# Patient Record
Sex: FEMALE | Race: WHITE | Employment: FULL TIME | URBAN - METROPOLITAN AREA
[De-identification: names, ages, dates, MRNs, and addresses within clinical notes are randomized per-mention and may not be internally consistent; named-entity substitution may affect disease eponyms.]

---

## 2018-02-01 ENCOUNTER — HOSPITAL ENCOUNTER (OUTPATIENT)
Dept: LAB | Age: 38
Discharge: HOME | End: 2018-02-01
Attending: SURGERY
Payer: MEDICAID

## 2018-02-01 LAB — TSH SERPL DL<=0.005 MIU/L-ACNC: 3.18 UIU/ML (ref 0.36–3.74)

## 2018-02-01 PROCEDURE — 36415 COLL VENOUS BLD VENIPUNCTURE: CPT | Performed by: SURGERY

## 2018-02-01 PROCEDURE — 82652 VIT D 1 25-DIHYDROXY: CPT | Performed by: SURGERY

## 2018-02-01 PROCEDURE — 84443 ASSAY THYROID STIM HORMONE: CPT | Performed by: SURGERY

## 2018-02-01 PROCEDURE — 80323 ALKALOIDS NOS: CPT | Performed by: SURGERY

## 2018-02-02 LAB — 1,25(OH)2D3 SERPL-MCNC: 22.3 PG/ML (ref 19.9–79.3)

## 2018-02-04 LAB
COTININE SERPL-MCNC: NORMAL NG/ML
NICOTINE SERPL-MCNC: NORMAL NG/ML

## 2018-03-15 PROBLEM — E66.01 OBESITY, MORBID (HCC): Status: ACTIVE | Noted: 2018-03-15

## 2018-03-15 PROBLEM — R06.83 SNORING: Status: ACTIVE | Noted: 2018-03-15

## 2018-03-15 PROBLEM — Z01.811 PRE-OPERATIVE RESPIRATORY EXAMINATION: Status: ACTIVE | Noted: 2018-03-15

## 2018-03-15 PROBLEM — R76.11 POSITIVE PPD, TREATED: Status: ACTIVE | Noted: 2018-03-15

## 2018-03-15 PROBLEM — G25.81 RESTLESS LEG SYNDROME: Status: ACTIVE | Noted: 2018-03-15

## 2018-03-15 PROBLEM — G47.8 UNREFRESHED BY SLEEP: Status: ACTIVE | Noted: 2018-03-15

## 2018-05-03 ENCOUNTER — HOSPITAL ENCOUNTER (OUTPATIENT)
Dept: SLEEP MEDICINE | Age: 38
Discharge: HOME | End: 2018-05-03
Payer: MEDICAID

## 2018-05-03 PROCEDURE — 95806 SLEEP STUDY UNATT&RESP EFFT: CPT

## 2018-06-26 ENCOUNTER — HOSPITAL ENCOUNTER (OUTPATIENT)
Dept: SLEEP MEDICINE | Age: 38
Discharge: HOME | End: 2018-06-26
Payer: MEDICAID

## 2018-06-26 PROCEDURE — 95806 SLEEP STUDY UNATT&RESP EFFT: CPT

## 2018-12-13 PROBLEM — K59.01 SLOW TRANSIT CONSTIPATION: Status: ACTIVE | Noted: 2018-12-13

## 2019-07-25 PROBLEM — Z01.811 PRE-OPERATIVE RESPIRATORY EXAMINATION: Status: RESOLVED | Noted: 2018-03-15 | Resolved: 2019-07-25

## 2019-07-25 PROBLEM — R76.11 POSITIVE PPD, TREATED: Status: RESOLVED | Noted: 2018-03-15 | Resolved: 2019-07-25

## 2019-07-25 PROBLEM — G25.81 RESTLESS LEG SYNDROME: Status: RESOLVED | Noted: 2018-03-15 | Resolved: 2019-07-25

## 2019-07-25 PROBLEM — R06.83 SNORING: Status: RESOLVED | Noted: 2018-03-15 | Resolved: 2019-07-25

## 2019-10-10 PROBLEM — J06.9 URI, ACUTE: Status: ACTIVE | Noted: 2019-10-10
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
## 2021-08-31 ENCOUNTER — HOSPITAL ENCOUNTER (EMERGENCY)
Age: 41
Discharge: HOME | End: 2021-08-31
Payer: MEDICAID

## 2021-08-31 VITALS
SYSTOLIC BLOOD PRESSURE: 139 MMHG | RESPIRATION RATE: 18 BRPM | HEART RATE: 83 BPM | WEIGHT: 230 LBS | OXYGEN SATURATION: 100 % | BODY MASS INDEX: 39.27 KG/M2 | TEMPERATURE: 98.5 F | DIASTOLIC BLOOD PRESSURE: 90 MMHG | HEIGHT: 64 IN

## 2021-08-31 DIAGNOSIS — R05.9 COUGH: Primary | ICD-10-CM

## 2021-08-31 LAB — SARS-COV-2, COV2: NORMAL

## 2021-08-31 PROCEDURE — U0004 COV-19 TEST NON-CDC HGH THRU: HCPCS

## 2021-08-31 PROCEDURE — 99282 EMERGENCY DEPT VISIT SF MDM: CPT

## 2021-08-31 NOTE — Clinical Note
Jelani Van  [address] [facility] EMERGENCY DEPT  [address] [address]  [phone]    Work/School Note    Date: 8/31/2021     To Whom It May concern:    Bear Crawford was evaulated by the following provider(s):  No providers found. COVID19 virus is suspected. Per the CDC guidelines we recommend home isolation until the following conditions are all met:    1. At least 10 days have passed since symptoms first appeared and  2. At least 24 hours have passed since last fever without the use of fever-reducing medications and  3.  Symptoms (e.g., cough, shortness of breath) have improved    Sincerely,          NIKKO Cowan

## 2021-08-31 NOTE — ED NOTES

## 2021-08-31 NOTE — ED PROVIDER NOTES
21-year-old female, not currently vaccinated, presents to the emergency room with chief complaint of headache, fever, body aches and loss of taste and smell since the 25th of this month. She has not been taking medications for symptoms at home. Associate with some mild myalgias. Past Medical History:   Diagnosis Date    Fatigue     Lack of energy     Musculoskeletal disorder     joint pain, back pain, leg pain    Slow transit constipation 12/13/2018    Snoring     Unrefreshed by sleep 3/15/2018    Varicose veins of legs     Varicose veins of legs        Past Surgical History:   Procedure Laterality Date    HX GYN      leep procedure    HX TUBAL LIGATION  01/2016         Family History:   Problem Relation Age of Onset    Hypertension Mother     Hypertension Father     Hypertension Maternal Grandmother        Social History     Socioeconomic History    Marital status:      Spouse name: Not on file    Number of children: Not on file    Years of education: Not on file    Highest education level: Not on file   Occupational History    Not on file   Tobacco Use    Smoking status: Never Smoker    Smokeless tobacco: Never Used   Substance and Sexual Activity    Alcohol use: Yes     Comment: occasional    Drug use: No    Sexual activity: Not on file   Other Topics Concern    Not on file   Social History Narrative    Not on file     Social Determinants of Health     Financial Resource Strain:     Difficulty of Paying Living Expenses:    Food Insecurity:     Worried About Running Out of Food in the Last Year:     Ran Out of Food in the Last Year:    Transportation Needs:     Lack of Transportation (Medical):      Lack of Transportation (Non-Medical):    Physical Activity:     Days of Exercise per Week:     Minutes of Exercise per Session:    Stress:     Feeling of Stress :    Social Connections:     Frequency of Communication with Friends and Family:     Frequency of Social Gatherings with Friends and Family:     Attends Faith Services:     Active Member of Clubs or Organizations:     Attends Club or Organization Meetings:     Marital Status:    Intimate Partner Violence:     Fear of Current or Ex-Partner:     Emotionally Abused:     Physically Abused:     Sexually Abused: ALLERGIES: Sulfa (sulfonamide antibiotics) and Ciprofloxacin    Review of Systems   Constitutional: Positive for chills and fever. HENT: Negative for facial swelling. Respiratory: Positive for cough. Negative for chest tightness and shortness of breath. Cardiovascular: Negative for chest pain. Gastrointestinal: Negative for abdominal pain, nausea and vomiting. Musculoskeletal: Positive for myalgias. Neurological: Positive for headaches. Psychiatric/Behavioral: Negative for confusion. All other systems reviewed and are negative. There were no vitals filed for this visit. Physical Exam  Vitals and nursing note reviewed. Constitutional:       Appearance: Normal appearance. HENT:      Head: Normocephalic and atraumatic. Mouth/Throat:      Mouth: Mucous membranes are moist.   Eyes:      Pupils: Pupils are equal, round, and reactive to light. Cardiovascular:      Rate and Rhythm: Normal rate and regular rhythm. Pulmonary:      Effort: No respiratory distress. Breath sounds: Normal breath sounds. Abdominal:      Palpations: Abdomen is soft. Tenderness: There is no abdominal tenderness. There is no guarding. Skin:     General: Skin is warm and dry. Neurological:      Mental Status: She is alert and oriented to person, place, and time. Mental status is at baseline. Psychiatric:         Mood and Affect: Mood normal.          MDM  Number of Diagnoses or Management Options  Diagnosis management comments: Unremarkable physical exam, she has 100 and oxygenation on room air.   Advised that she quarantine at home, PCR swab was collected here in the emergency room. She was instructed to check my chart for her test results tomorrow morning. At time of discharge patient is resting comfortably no acute distress. Voice dictation software was used during the making of this note. This software is not perfect and grammatical and other typographical errors may be present. This note has been proofread, but may still contain errors.    Blake Cifuentes PA-C        Amount and/or Complexity of Data Reviewed  Clinical lab tests: ordered    Risk of Complications, Morbidity, and/or Mortality  Presenting problems: low  Diagnostic procedures: low  Management options: low    Patient Progress  Patient progress: improved         Procedures

## 2021-09-02 LAB — SARS COV-2, XPGCVT: POSITIVE

## 2021-09-16 ENCOUNTER — HOSPITAL ENCOUNTER (EMERGENCY)
Age: 41
Discharge: HOME | End: 2021-09-16
Payer: MEDICAID

## 2021-09-16 VITALS
WEIGHT: 229.94 LBS | TEMPERATURE: 98.1 F | HEIGHT: 64 IN | DIASTOLIC BLOOD PRESSURE: 61 MMHG | SYSTOLIC BLOOD PRESSURE: 100 MMHG | HEART RATE: 85 BPM | BODY MASS INDEX: 39.26 KG/M2 | RESPIRATION RATE: 16 BRPM | OXYGEN SATURATION: 100 %

## 2021-09-16 DIAGNOSIS — A59.9 TRICHOMONAS VAGINALIS INFECTION: ICD-10-CM

## 2021-09-16 DIAGNOSIS — B96.89 BV (BACTERIAL VAGINOSIS): Primary | ICD-10-CM

## 2021-09-16 DIAGNOSIS — N39.0 URINARY TRACT INFECTION WITHOUT HEMATURIA, SITE UNSPECIFIED: ICD-10-CM

## 2021-09-16 DIAGNOSIS — N76.0 BV (BACTERIAL VAGINOSIS): Primary | ICD-10-CM

## 2021-09-16 LAB
BACTERIA URNS QL MICRO: 0 /HPF
CASTS URNS QL MICRO: ABNORMAL /LPF
EPI CELLS #/AREA URNS HPF: ABNORMAL /HPF
HCG UR QL: NEGATIVE
RBC #/AREA URNS HPF: ABNORMAL /HPF
SERVICE CMNT-IMP: NORMAL
WBC URNS QL MICRO: >100 /HPF
WET PREP GENITAL: NORMAL

## 2021-09-16 PROCEDURE — 81025 URINE PREGNANCY TEST: CPT

## 2021-09-16 PROCEDURE — 81015 MICROSCOPIC EXAM OF URINE: CPT

## 2021-09-16 PROCEDURE — 87491 CHLMYD TRACH DNA AMP PROBE: CPT

## 2021-09-16 PROCEDURE — 99284 EMERGENCY DEPT VISIT MOD MDM: CPT

## 2021-09-16 PROCEDURE — 87210 SMEAR WET MOUNT SALINE/INK: CPT

## 2021-09-16 RX ORDER — METRONIDAZOLE 500 MG/1
500 TABLET ORAL 2 TIMES DAILY
Qty: 14 TABLET | Refills: 0 | Status: SHIPPED | OUTPATIENT
Start: 2021-09-16 | End: 2021-09-23

## 2021-09-16 RX ORDER — CEPHALEXIN 500 MG/1
500 CAPSULE ORAL 4 TIMES DAILY
Qty: 28 CAPSULE | Refills: 0 | Status: SHIPPED | OUTPATIENT
Start: 2021-09-16 | End: 2021-09-23

## 2021-09-16 NOTE — ED PROVIDER NOTES
50-year-old female complaining of vaginal discharge and lower abdominal discomfort. Vaginal Discharge   This is a new problem. The current episode started more than 2 days ago. The problem occurs constantly. The problem has not changed since onset. The discharge was malodorous. Associated symptoms include abdominal pain and constipation. Pertinent negatives include no diarrhea. Abdominal Pain   Associated symptoms include constipation. Pertinent negatives include no diarrhea. Past Medical History:   Diagnosis Date    Fatigue     Lack of energy     Musculoskeletal disorder     joint pain, back pain, leg pain    Slow transit constipation 12/13/2018    Snoring     Unrefreshed by sleep 3/15/2018    URI, acute     Varicose veins of legs     Varicose veins of legs        Past Surgical History:   Procedure Laterality Date    HX GYN      leep procedure    HX TUBAL LIGATION  01/2016         Family History:   Problem Relation Age of Onset    Hypertension Mother     Hypertension Father     Hypertension Maternal Grandmother        Social History     Socioeconomic History    Marital status:      Spouse name: Not on file    Number of children: Not on file    Years of education: Not on file    Highest education level: Not on file   Occupational History    Not on file   Tobacco Use    Smoking status: Never Smoker    Smokeless tobacco: Never Used   Substance and Sexual Activity    Alcohol use: Yes     Comment: occasional    Drug use: No    Sexual activity: Not on file   Other Topics Concern    Not on file   Social History Narrative    Not on file     Social Determinants of Health     Financial Resource Strain:     Difficulty of Paying Living Expenses:    Food Insecurity:     Worried About Running Out of Food in the Last Year:     Ran Out of Food in the Last Year:    Transportation Needs:     Lack of Transportation (Medical):      Lack of Transportation (Non-Medical):    Physical Activity:     Days of Exercise per Week:     Minutes of Exercise per Session:    Stress:     Feeling of Stress :    Social Connections:     Frequency of Communication with Friends and Family:     Frequency of Social Gatherings with Friends and Family:     Attends Spiritism Services:     Active Member of Clubs or Organizations:     Attends Club or Organization Meetings:     Marital Status:    Intimate Partner Violence:     Fear of Current or Ex-Partner:     Emotionally Abused:     Physically Abused:     Sexually Abused: ALLERGIES: Sulfa (sulfonamide antibiotics) and Ciprofloxacin    Review of Systems   Constitutional: Negative. Negative for activity change. HENT: Negative. Eyes: Negative. Respiratory: Negative. Cardiovascular: Negative. Gastrointestinal: Positive for abdominal pain and constipation. Negative for diarrhea. Genitourinary: Positive for vaginal discharge. Musculoskeletal: Negative. Skin: Negative. Neurological: Negative. Psychiatric/Behavioral: Negative. All other systems reviewed and are negative. Vitals:    09/16/21 0258 09/16/21 0620 09/16/21 0621 09/16/21 0622   BP: 103/71 100/61     Pulse: 96  85 85   Resp: 16      Temp: 98.1 °F (36.7 °C)      SpO2: 97%  100% 100%   Weight: 104.3 kg (229 lb 15 oz)      Height: 5' 4\" (1.626 m)               Physical Exam  Vitals and nursing note reviewed. Constitutional:       General: She is not in acute distress. Appearance: She is well-developed. HENT:      Head: Normocephalic and atraumatic. Right Ear: External ear normal.      Left Ear: External ear normal.      Nose: Nose normal.   Eyes:      General: No scleral icterus. Right eye: No discharge. Left eye: No discharge. Conjunctiva/sclera: Conjunctivae normal.      Pupils: Pupils are equal, round, and reactive to light. Cardiovascular:      Rate and Rhythm: Regular rhythm.    Pulmonary:      Effort: Pulmonary effort is normal. No respiratory distress. Breath sounds: Normal breath sounds. No stridor. No wheezing or rales. Abdominal:      General: Bowel sounds are normal. There is no distension. Palpations: Abdomen is soft. Tenderness: There is no abdominal tenderness. Musculoskeletal:         General: Normal range of motion. Cervical back: Normal range of motion. Skin:     General: Skin is warm and dry. Findings: No rash. Neurological:      Mental Status: She is alert and oriented to person, place, and time. Motor: No abnormal muscle tone. Coordination: Coordination normal.   Psychiatric:         Behavior: Behavior normal.          MDM  Number of Diagnoses or Management Options  BV (bacterial vaginosis)  Trichomonas vaginalis infection  Urinary tract infection without hematuria, site unspecified  Diagnosis management comments: Patient's abdominal discomfort had resolved prior to the initial evaluation. Serial examination of the abdomen was soft nontender bowel sounds were present. She did discuss having some constipation earlier. She was found to have both bacterial vaginosis and trichomonas on the wet prep. We will treat her with Flagyl.   She also had a UTI we will give her antibiotics for that as well       Amount and/or Complexity of Data Reviewed  Clinical lab tests: ordered and reviewed  Tests in the medicine section of CPT®: ordered and reviewed  Decide to obtain previous medical records or to obtain history from someone other than the patient: yes  Review and summarize past medical records: yes  Independent visualization of images, tracings, or specimens: yes           Procedures

## 2021-09-16 NOTE — ED NOTES
I have reviewed discharge instructions with the patient. The patient verbalized understanding. Patient left ED via Discharge Method: ambulatory to Home with self. Opportunity for questions and clarification provided. Patient given 2 scripts. To continue your aftercare when you leave the hospital, you may receive an automated call from our care team to check in on how you are doing. This is a free service and part of our promise to provide the best care and service to meet your aftercare needs.  If you have questions, or wish to unsubscribe from this service please call [phone]. Thank you for Choosing our [facility] Emergency Department.

## 2021-09-16 NOTE — ED TRIAGE NOTES
Pt states that she has noticed some loser abd pain with yellow vaginal drainage for approx 4 days.   Masked on arrrival

## 2021-09-17 LAB
C TRACH RRNA SPEC QL NAA+PROBE: NEGATIVE
N GONORRHOEA RRNA SPEC QL NAA+PROBE: NEGATIVE
SPECIMEN SOURCE: NORMAL

## 2021-09-25 ENCOUNTER — HOSPITAL ENCOUNTER (EMERGENCY)
Age: 41
Discharge: HOME | End: 2021-09-25
Attending: EMERGENCY MEDICINE
Payer: MEDICAID

## 2021-09-25 VITALS
TEMPERATURE: 98.5 F | DIASTOLIC BLOOD PRESSURE: 56 MMHG | SYSTOLIC BLOOD PRESSURE: 117 MMHG | RESPIRATION RATE: 16 BRPM | HEART RATE: 88 BPM | OXYGEN SATURATION: 97 %

## 2021-09-25 DIAGNOSIS — L03.115 CELLULITIS OF RIGHT LOWER EXTREMITY: Primary | ICD-10-CM

## 2021-09-25 PROCEDURE — 74011250637 HC RX REV CODE- 250/637: Performed by: EMERGENCY MEDICINE

## 2021-09-25 PROCEDURE — 99283 EMERGENCY DEPT VISIT LOW MDM: CPT

## 2021-09-25 RX ORDER — CLINDAMYCIN HYDROCHLORIDE 150 MG/1
300 CAPSULE ORAL
Status: COMPLETED | OUTPATIENT
Start: 2021-09-25 | End: 2021-09-25

## 2021-09-25 RX ORDER — CLINDAMYCIN HYDROCHLORIDE 300 MG/1
300 CAPSULE ORAL 4 TIMES DAILY
Qty: 28 CAPSULE | Refills: 0 | Status: SHIPPED | OUTPATIENT
Start: 2021-09-25 | End: 2021-10-02

## 2021-09-25 RX ADMIN — CLINDAMYCIN HYDROCHLORIDE 300 MG: 150 CAPSULE ORAL at 03:34

## 2021-09-25 NOTE — ED PROVIDER NOTES
Chief complaint : Insect bite    HISTORY OF PRESENT ILLNESS :  Location : Right lower leg just above ankle    Quality : Warm and red    Quantity : Constant    Timing : 2 days    Severity : Mild    Context : Is not sure what bit or stung her    Alleviating / exacerbating factors : Just completing Keflex antibiotics for UTI which has not seemed to margaux the skin infection    Associated Symptoms : No fevers             Past Medical History:   Diagnosis Date    Fatigue     Lack of energy     Musculoskeletal disorder     joint pain, back pain, leg pain    Slow transit constipation 12/13/2018    Snoring     Unrefreshed by sleep 3/15/2018    URI, acute     Varicose veins of legs     Varicose veins of legs        Past Surgical History:   Procedure Laterality Date    HX GYN      leep procedure    HX TUBAL LIGATION  01/2016         Family History:   Problem Relation Age of Onset    Hypertension Mother     Hypertension Father     Hypertension Maternal Grandmother        Social History     Socioeconomic History    Marital status: LEGALLY      Spouse name: Not on file    Number of children: Not on file    Years of education: Not on file    Highest education level: Not on file   Occupational History    Not on file   Tobacco Use    Smoking status: Never Smoker    Smokeless tobacco: Never Used   Substance and Sexual Activity    Alcohol use: Yes     Comment: occasional    Drug use: No    Sexual activity: Not on file   Other Topics Concern    Not on file   Social History Narrative    Not on file     Social Determinants of Health     Financial Resource Strain:     Difficulty of Paying Living Expenses:    Food Insecurity:     Worried About Running Out of Food in the Last Year:     Ran Out of Food in the Last Year:    Transportation Needs:     Lack of Transportation (Medical):      Lack of Transportation (Non-Medical):    Physical Activity:     Days of Exercise per Week:     Minutes of Exercise per Session:    Stress:     Feeling of Stress :    Social Connections:     Frequency of Communication with Friends and Family:     Frequency of Social Gatherings with Friends and Family:     Attends Voodoo Services:     Active Member of Clubs or Organizations:     Attends Club or Organization Meetings:     Marital Status:    Intimate Partner Violence:     Fear of Current or Ex-Partner:     Emotionally Abused:     Physically Abused:     Sexually Abused: ALLERGIES: Sulfa (sulfonamide antibiotics) and Ciprofloxacin    Review of Systems   Constitutional: Negative for chills and fever. Musculoskeletal: Negative for arthralgias and joint swelling. Skin: Positive for color change and rash. Negative for pallor and wound. Neurological: Negative for weakness and numbness. Vitals:    09/25/21 0208 09/25/21 0211   BP: (!) 117/56 (!) 117/56   Pulse:  88   Resp:  16   Temp:  98.5 °F (36.9 °C)   SpO2:  97%            Physical Exam  Vitals and nursing note reviewed. Constitutional:       General: She is not in acute distress. Appearance: Normal appearance. She is well-developed. She is not ill-appearing, toxic-appearing or diaphoretic. HENT:      Head: Normocephalic and atraumatic. Right Ear: External ear normal.      Left Ear: External ear normal.   Eyes:      General:         Right eye: No discharge. Left eye: No discharge. Conjunctiva/sclera: Conjunctivae normal.   Pulmonary:      Effort: Pulmonary effort is normal. No respiratory distress. Musculoskeletal:         General: Normal range of motion. Cervical back: Normal range of motion and neck supple. Legs:    Skin:     General: Skin is warm and dry. Findings: No rash. Neurological:      General: No focal deficit present. Mental Status: She is alert and oriented to person, place, and time. Mental status is at baseline. Motor: No abnormal muscle tone.       Comments: cni 2-12 grossly  Nl gait,  Nl speech     Psychiatric:         Mood and Affect: Mood normal.         Behavior: Behavior normal.          MDM  Number of Diagnoses or Management Options  Cellulitis of right lower extremity: new and does not require workup  Diagnosis management comments: Medical decision making note:  Cellulitis to right lower extremity presumptive insect bite, just finished Keflex, will start clindamycin  Area of involvement outlined with surgical skin marker  This concludes the \"medical decision making note\" part of this emergency department visit note.          Amount and/or Complexity of Data Reviewed  Decide to obtain previous medical records or to obtain history from someone other than the patient: yes    Risk of Complications, Morbidity, and/or Mortality  Presenting problems: moderate  Diagnostic procedures: minimal  Management options: low    Patient Progress  Patient progress: improved         Procedures

## 2021-09-25 NOTE — ED NOTES
I have reviewed discharge instructions with the patient. The patient verbalized understanding. Patient left ED via Discharge Method: ambulatory to Home with self  . Opportunity for questions and clarification provided. Patient given 1 scripts. To continue your aftercare when you leave the hospital, you may receive an automated call from our care team to check in on how you are doing. This is a free service and part of our promise to provide the best care and service to meet your aftercare needs.  If you have questions, or wish to unsubscribe from this service please call [phone]. Thank you for Choosing our [address] [place] [address] Emergency Department.

## 2022-01-10 ENCOUNTER — HOSPITAL ENCOUNTER (EMERGENCY)
Age: 42
Discharge: HOME | End: 2022-01-10
Payer: MEDICAID

## 2022-01-10 VITALS
HEIGHT: 64 IN | HEART RATE: 60 BPM | DIASTOLIC BLOOD PRESSURE: 72 MMHG | SYSTOLIC BLOOD PRESSURE: 110 MMHG | BODY MASS INDEX: 38.41 KG/M2 | RESPIRATION RATE: 18 BRPM | OXYGEN SATURATION: 97 % | TEMPERATURE: 98.1 F | WEIGHT: 225 LBS

## 2022-01-10 DIAGNOSIS — B96.89 BV (BACTERIAL VAGINOSIS): Primary | ICD-10-CM

## 2022-01-10 DIAGNOSIS — N76.0 BV (BACTERIAL VAGINOSIS): Primary | ICD-10-CM

## 2022-01-10 LAB
COVID-19 RAPID TEST, COVR: NOT DETECTED
SERVICE CMNT-IMP: NORMAL
SOURCE, COVRS: NORMAL
WET PREP GENITAL: NORMAL

## 2022-01-10 PROCEDURE — 99283 EMERGENCY DEPT VISIT LOW MDM: CPT

## 2022-01-10 PROCEDURE — 87635 SARS-COV-2 COVID-19 AMP PRB: CPT

## 2022-01-10 PROCEDURE — 87491 CHLMYD TRACH DNA AMP PROBE: CPT

## 2022-01-10 PROCEDURE — 87210 SMEAR WET MOUNT SALINE/INK: CPT

## 2022-01-10 RX ORDER — METRONIDAZOLE 500 MG/1
500 TABLET ORAL 2 TIMES DAILY
Qty: 14 TABLET | Refills: 0 | Status: SHIPPED | OUTPATIENT
Start: 2022-01-10 | End: 2022-01-17

## 2022-01-10 NOTE — ED NOTES
I have reviewed discharge instructions with the patient. The patient verbalized understanding. Patient left ED via Discharge Method: ambulatory to Home with self. Opportunity for questions and clarification provided. Patient given 1 scripts. To continue your aftercare when you leave the hospital, you may receive an automated call from our care team to check in on how you are doing. This is a free service and part of our promise to provide the best care and service to meet your aftercare needs.  If you have questions, or wish to unsubscribe from this service please call [phone]. Thank you for Choosing our [address] [place] [address] Emergency Department.

## 2022-01-10 NOTE — ED PROVIDER NOTES
42-year-old female is curious whether she has Covid of symptoms. She is diagnosed with Covid back in December 30 she has to return to work and needs a negative test before she gets back. Patient also mentions that she has vaginal discharge that comes and goes. Vaginal Discharge   This is a recurrent problem. The current episode started more than 2 days ago. The problem occurs constantly. The problem has not changed since onset. The discharge was malodorous. Pertinent negatives include no anorexia, no diaphoresis, no fever, no abdominal swelling, no abdominal pain, no constipation, no diarrhea, no nausea and no vomiting. She has tried nothing for the symptoms.         Past Medical History:   Diagnosis Date    Fatigue     Lack of energy     Musculoskeletal disorder     joint pain, back pain, leg pain    Slow transit constipation 12/13/2018    Snoring     Unrefreshed by sleep 3/15/2018    URI, acute     Varicose veins of legs     Varicose veins of legs        Past Surgical History:   Procedure Laterality Date    HX GYN      leep procedure    HX TUBAL LIGATION  01/2016         Family History:   Problem Relation Age of Onset    Hypertension Mother     Hypertension Father     Hypertension Maternal Grandmother        Social History     Socioeconomic History    Marital status: LEGALLY      Spouse name: Not on file    Number of children: Not on file    Years of education: Not on file    Highest education level: Not on file   Occupational History    Not on file   Tobacco Use    Smoking status: Never Smoker    Smokeless tobacco: Never Used   Substance and Sexual Activity    Alcohol use: Yes     Comment: occasional    Drug use: No    Sexual activity: Not on file   Other Topics Concern    Not on file   Social History Narrative    Not on file     Social Determinants of Health     Financial Resource Strain:     Difficulty of Paying Living Expenses: Not on file   Food Insecurity:     Worried About [facility] in the Last Year: Not on file    Ankit of Food in the Last Year: Not on file   Transportation Needs:     Lack of Transportation (Medical): Not on file    Lack of Transportation (Non-Medical): Not on file   Physical Activity:     Days of Exercise per Week: Not on file    Minutes of Exercise per Session: Not on file   Stress:     Feeling of Stress : Not on file   Social Connections:     Frequency of Communication with Friends and Family: Not on file    Frequency of Social Gatherings with Friends and Family: Not on file    Attends Hindu Services: Not on file    Active Member of [address] [place] [address] or Organizations: Not on file    Attends Club or Organization Meetings: Not on file    Marital Status: Not on file   Intimate Partner Violence:     Fear of Current or Ex-Partner: Not on file    Emotionally Abused: Not on file    Physically Abused: Not on file    Sexually Abused: Not on file   Housing Stability:     Unable to Pay for Housing in the Last Year: Not on file    Number of [place] in the Last Year: Not on file    Unstable Housing in the Last Year: Not on file         ALLERGIES: Sulfa (sulfonamide antibiotics) and Ciprofloxacin    Review of Systems   Constitutional: Negative. Negative for activity change, diaphoresis and fever. HENT: Negative. Eyes: Negative. Respiratory: Negative. Cardiovascular: Negative. Gastrointestinal: Negative. Negative for abdominal pain, anorexia, constipation, diarrhea, nausea and vomiting. Genitourinary: Positive for vaginal discharge. Musculoskeletal: Negative. Skin: Negative. Neurological: Negative. Psychiatric/Behavioral: Negative. All other systems reviewed and are negative. Vitals:    01/10/22 0137   BP: 110/72   Pulse: 61   Resp: 18   Temp: 98.1 °F (36.7 °C)   SpO2: 97%   Weight: 102.1 kg (225 lb)   Height: 5' 4\" (1.626 m)            Physical Exam  Vitals and nursing note reviewed. Constitutional:       General: She is not in acute distress. Appearance: She is well-developed. HENT:      Head: Normocephalic and atraumatic. Right Ear: External ear normal.      Left Ear: External ear normal.      Nose: Nose normal.   Eyes:      General: No scleral icterus. Right eye: No discharge. Left eye: No discharge. Conjunctiva/sclera: Conjunctivae normal.      Pupils: Pupils are equal, round, and reactive to light. Cardiovascular:      Rate and Rhythm: Regular rhythm. Pulmonary:      Effort: Pulmonary effort is normal. No respiratory distress. Breath sounds: No stridor. No wheezing or rales. Abdominal:      General: There is no distension. Palpations: Abdomen is soft. Tenderness: There is no abdominal tenderness. Musculoskeletal:         General: Normal range of motion. Cervical back: Normal range of motion. Skin:     General: Skin is warm and dry. Findings: No rash. Neurological:      Mental Status: She is alert and oriented to person, place, and time. Motor: No abnormal muscle tone. Coordination: Coordination normal.   Psychiatric:         Behavior: Behavior normal.          MDM  Number of Diagnoses or Management Options  Diagnosis management comments: Patient came to the emergency department at 2 AM to be tested for COVID-19. She needs a negative test before returning to work. She also while she was here thought she would get treated for vaginal discharge. She has had bacterial vaginosis in the past.  She did not follow-up with gynecology as instructed.        Amount and/or Complexity of Data Reviewed  Clinical lab tests: ordered and reviewed  Tests in the medicine section of CPT®: ordered and reviewed  Decide to obtain previous medical records or to obtain history from someone other than the patient: yes  Review and summarize past medical records: yes  Independent visualization of images, tracings, or specimens: yes    Risk of Complications, Morbidity, and/or Mortality  Presenting problems: moderate  Diagnostic procedures: moderate  Management options: moderate           Procedures

## 2022-02-15 ENCOUNTER — HOSPITAL ENCOUNTER (EMERGENCY)
Age: 42
Discharge: HOME | End: 2022-02-15
Attending: STUDENT IN AN ORGANIZED HEALTH CARE EDUCATION/TRAINING PROGRAM
Payer: MEDICAID

## 2022-02-15 VITALS
SYSTOLIC BLOOD PRESSURE: 119 MMHG | OXYGEN SATURATION: 100 % | RESPIRATION RATE: 18 BRPM | WEIGHT: 225.09 LBS | TEMPERATURE: 98.6 F | DIASTOLIC BLOOD PRESSURE: 78 MMHG | HEART RATE: 75 BPM | BODY MASS INDEX: 38.43 KG/M2 | HEIGHT: 64 IN

## 2022-02-15 DIAGNOSIS — B96.89 BV (BACTERIAL VAGINOSIS): Primary | ICD-10-CM

## 2022-02-15 DIAGNOSIS — N76.0 BV (BACTERIAL VAGINOSIS): Primary | ICD-10-CM

## 2022-02-15 LAB
BACTERIA URNS QL MICRO: NORMAL /HPF
CASTS URNS QL MICRO: NORMAL /LPF
EPI CELLS #/AREA URNS HPF: NORMAL /HPF
RBC #/AREA URNS HPF: NORMAL /HPF
SERVICE CMNT-IMP: NORMAL
WBC URNS QL MICRO: NORMAL /HPF
WET PREP GENITAL: NORMAL

## 2022-02-15 PROCEDURE — 87491 CHLMYD TRACH DNA AMP PROBE: CPT

## 2022-02-15 PROCEDURE — 81015 MICROSCOPIC EXAM OF URINE: CPT

## 2022-02-15 PROCEDURE — 99284 EMERGENCY DEPT VISIT MOD MDM: CPT

## 2022-02-15 PROCEDURE — 81003 URINALYSIS AUTO W/O SCOPE: CPT

## 2022-02-15 PROCEDURE — 87210 SMEAR WET MOUNT SALINE/INK: CPT

## 2022-02-15 RX ORDER — METRONIDAZOLE 500 MG/1
500 TABLET ORAL 2 TIMES DAILY
Qty: 14 TABLET | Refills: 0 | Status: SHIPPED | OUTPATIENT
Start: 2022-02-15 | End: 2022-02-22

## 2022-02-15 NOTE — ED PROVIDER NOTES
43-year-old female presents to the emergency department complaining of foul-smelling vaginal discharge. States has been present for approximately 1 week. Denies dysuria or vaginal lesions. Denies tampon or any foreign body left within her vagina. Denies concern for STDs. States abdominal pain is intermittent and is not present at this time. Reports she was seen for similar issue approximately 1 month ago. States it did improve but has returned. Past Medical History:   Diagnosis Date    Fatigue     Lack of energy     Musculoskeletal disorder     joint pain, back pain, leg pain    Slow transit constipation 12/13/2018    Snoring     Unrefreshed by sleep 3/15/2018    URI, acute     Varicose veins of legs     Varicose veins of legs        Past Surgical History:   Procedure Laterality Date    HX GYN      leep procedure    HX TUBAL LIGATION  01/2016         Family History:   Problem Relation Age of Onset    Hypertension Mother     Hypertension Father     Hypertension Maternal Grandmother        Social History     Socioeconomic History    Marital status: LEGALLY      Spouse name: Not on file    Number of children: Not on file    Years of education: Not on file    Highest education level: Not on file   Occupational History    Not on file   Tobacco Use    Smoking status: Never Smoker    Smokeless tobacco: Never Used   Substance and Sexual Activity    Alcohol use: Yes     Comment: occasional    Drug use: No    Sexual activity: Not on file   Other Topics Concern    Not on file   Social History Narrative    Not on file     Social Determinants of Health     Financial Resource Strain:     Difficulty of Paying Living Expenses: Not on file   Food Insecurity:     Worried About Running Out of Food in the Last Year: Not on file    Ankit of Food in the Last Year: Not on file   Transportation Needs:     Lack of Transportation (Medical):  Not on file    Lack of Transportation (Non-Medical): Not on file   Physical Activity:     Days of Exercise per Week: Not on file    Minutes of Exercise per Session: Not on file   Stress:     Feeling of Stress : Not on file   Social Connections:     Frequency of Communication with Friends and Family: Not on file    Frequency of Social Gatherings with Friends and Family: Not on file    Attends Jew Services: Not on file    Active Member of [address] [place] [address] or Organizations: Not on file    Attends Club or Organization Meetings: Not on file    Marital Status: Not on file   Intimate Partner Violence:     Fear of Current or Ex-Partner: Not on file    Emotionally Abused: Not on file    Physically Abused: Not on file    Sexually Abused: Not on file   Housing Stability:     Unable to Pay for Housing in the Last Year: Not on file    Number of [place] in the Last Year: Not on file    Unstable Housing in the Last Year: Not on file         ALLERGIES: Sulfa (sulfonamide antibiotics) and Ciprofloxacin    Review of Systems   Constitutional: Negative for chills and fever. HENT: Negative for sinus pressure and sore throat. Eyes: Negative for visual disturbance. Respiratory: Negative for cough and shortness of breath. Cardiovascular: Negative for chest pain. Gastrointestinal: Negative for abdominal pain, diarrhea, nausea and vomiting. Endocrine: Negative for polyuria. Genitourinary: Positive for vaginal discharge. Negative for difficulty urinating and dysuria. Musculoskeletal: Negative for neck pain and neck stiffness. Skin: Negative for rash. Neurological: Negative for weakness and headaches. Psychiatric/Behavioral: Negative for confusion. Vitals:    02/15/22 0304   BP: 119/78   Pulse: 71   Resp: 20   Temp: 98.5 °F (36.9 °C)   SpO2: 98%   Weight: 102.1 kg (225 lb 1.4 oz)   Height: 5' 4\" (1.626 m)            Physical Exam  Vitals and nursing note reviewed. Constitutional:       Appearance: Normal appearance.  She is not ill-appearing or toxic-appearing. HENT:      Head: Normocephalic and atraumatic. Nose: Nose normal.      Mouth/Throat:      Mouth: Mucous membranes are moist.   Eyes:      Extraocular Movements: Extraocular movements intact. Cardiovascular:      Rate and Rhythm: Normal rate and regular rhythm. Pulses: Normal pulses. Heart sounds: Normal heart sounds. Pulmonary:      Effort: Pulmonary effort is normal. No respiratory distress. Breath sounds: Normal breath sounds. Abdominal:      General: Abdomen is flat. There is no distension. Palpations: Abdomen is soft. Tenderness: There is no abdominal tenderness. Musculoskeletal:         General: Normal range of motion. Cervical back: Normal range of motion. No rigidity. Skin:     General: Skin is warm and dry. Neurological:      General: No focal deficit present. Mental Status: She is alert and oriented to person, place, and time. Psychiatric:         Mood and Affect: Mood normal.          MDM  Number of Diagnoses or Management Options  BV (bacterial vaginosis)  Diagnosis management comments: Well-appearing 27-year-old female presents to the emergency department with foul-smelling vaginal discharge. Wet prep shows bacterial vaginosis. Urinalysis is nondiagnostic for having a urinary tract infection. Gonorrhea and Chlamydia pending. Patient advised to follow-up with her OB/GYN within 1 week. Given prescription for Flagyl. Given strict return precautions.   She voiced understanding agreement with this plan       Amount and/or Complexity of Data Reviewed  Clinical lab tests: ordered and reviewed  Decide to obtain previous medical records or to obtain history from someone other than the patient: yes  Review and summarize past medical records: yes    Risk of Complications, Morbidity, and/or Mortality  Presenting problems: moderate  Diagnostic procedures: moderate  Management options: moderate Procedures

## 2022-02-15 NOTE — ED TRIAGE NOTES
Pt states that she is having lower abd pain and foul smelling vaginal draingage x 1 week. Recently treated for BV.   Masked on arrival

## 2022-02-15 NOTE — DISCHARGE INSTRUCTIONS
Take antibiotics as prescribed. Follow-up with OB/GYN in 1 to 2 weeks. Return to the ER for worsening or worrisome symptoms.

## 2022-02-15 NOTE — ED NOTES
I have reviewed discharge instructions with the patient. The patient verbalized understanding. Patient left ED via Discharge Method: ambulatory to Home with self. Opportunity for questions and clarification provided. Patient given 1 scripts. To continue your aftercare when you leave the hospital, you may receive an automated call from our care team to check in on how you are doing. This is a free service and part of our promise to provide the best care and service to meet your aftercare needs.  If you have questions, or wish to unsubscribe from this service please call [phone]. Thank you for Choosing our [facility] Emergency Department.

## 2022-03-18 PROBLEM — G47.8 UNREFRESHED BY SLEEP: Status: ACTIVE | Noted: 2018-03-15

## 2022-03-18 PROBLEM — E66.01 OBESITY, MORBID (HCC): Status: ACTIVE | Noted: 2018-03-15

## 2022-03-18 PROBLEM — K59.01 SLOW TRANSIT CONSTIPATION: Status: ACTIVE | Noted: 2018-12-13
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
## 2022-03-19 PROBLEM — J06.9 URI, ACUTE: Status: ACTIVE | Noted: 2019-10-10

## 2023-03-27 ENCOUNTER — HOSPITAL ENCOUNTER (EMERGENCY)
Age: 43
Discharge: HOME | End: 2023-03-27
Attending: EMERGENCY MEDICINE
Payer: MEDICAID

## 2023-03-27 VITALS
SYSTOLIC BLOOD PRESSURE: 125 MMHG | TEMPERATURE: 98.1 F | HEIGHT: 64 IN | OXYGEN SATURATION: 100 % | DIASTOLIC BLOOD PRESSURE: 69 MMHG | BODY MASS INDEX: 40.12 KG/M2 | WEIGHT: 235 LBS | HEART RATE: 83 BPM | RESPIRATION RATE: 17 BRPM

## 2023-03-27 DIAGNOSIS — R10.2 SUPRAPUBIC ABDOMINAL PAIN: Primary | ICD-10-CM

## 2023-03-27 DIAGNOSIS — N89.8 VAGINAL DISCHARGE: ICD-10-CM

## 2023-03-27 LAB
SERVICE CMNT-IMP: NORMAL
WET PREP GENITAL: NORMAL
WET PREP GENITAL: NORMAL

## 2023-03-27 PROCEDURE — 87491 CHLMYD TRACH DNA AMP PROBE: CPT

## 2023-03-27 PROCEDURE — 87210 SMEAR WET MOUNT SALINE/INK: CPT

## 2023-03-27 PROCEDURE — 6370000000 HC RX 637 (ALT 250 FOR IP): Performed by: EMERGENCY MEDICINE

## 2023-03-27 PROCEDURE — 99283 EMERGENCY DEPT VISIT LOW MDM: CPT

## 2023-03-27 RX ORDER — IBUPROFEN 800 MG/1
800 TABLET ORAL
Status: COMPLETED | OUTPATIENT
Start: 2023-03-27 | End: 2023-03-27

## 2023-03-27 RX ADMIN — IBUPROFEN 800 MG: 800 TABLET, FILM COATED ORAL at 04:22

## 2023-03-27 ASSESSMENT — PAIN DESCRIPTION - LOCATION: LOCATION: ABDOMEN

## 2023-03-27 ASSESSMENT — PAIN - FUNCTIONAL ASSESSMENT: PAIN_FUNCTIONAL_ASSESSMENT: 0-10

## 2023-03-27 ASSESSMENT — PAIN SCALES - GENERAL
PAINLEVEL_OUTOF10: 6
PAINLEVEL_OUTOF10: 7

## 2023-03-27 ASSESSMENT — LIFESTYLE VARIABLES
HOW OFTEN DO YOU HAVE A DRINK CONTAINING ALCOHOL: NEVER
HOW MANY STANDARD DRINKS CONTAINING ALCOHOL DO YOU HAVE ON A TYPICAL DAY: PATIENT DOES NOT DRINK

## 2023-03-27 ASSESSMENT — PAIN DESCRIPTION - DESCRIPTORS: DESCRIPTORS: CRAMPING

## 2023-03-27 ASSESSMENT — PAIN DESCRIPTION - ORIENTATION: ORIENTATION: LOWER

## 2023-03-27 NOTE — ED PROVIDER NOTES
Emergency Department Provider Note                   PCP:                Leana Santillan MD               Age: 40 y.o. Sex: female     DISPOSITION Decision To Discharge 03/27/2023 05:19:13 AM       ICD-10-CM    1. Suprapubic abdominal pain  R10.2       2. Vaginal discharge  N89.8           MEDICAL DECISION MAKING  Complexity of Problems Addressed:  1 acute illness    Data Reviewed and Analyzed:  Category 1:     I ordered each unique test.  I interpreted the results of each unique test.        Category 2:           Category 3: Discussion of management or test interpretation. Labs and imaging testing were initially considered however patient has no current pain on my abdominal exam at all. Thus urine and vaginal swabs ordered. Urine was normal and wet prep was normal as well. I initially ordered Rocephin and was planning on doing doxycycline to go home for empiric STD treatment. However patient declined this and wishes to wait until culture results are available in a few days. We did give her information to download the [employer] [place]. Risk of Complications and/or Morbidity of Patient Management:  Shared medical decision making was utilized in creating the patients health plan today. Karmen Jim is a 40 y.o. female who presents to the Emergency Department with chief complaint of    Chief Complaint   Patient presents with    Abdominal Pain      Patient reports some lower abdominal pain for last 3 days. She states it has been very intermittent and in the low central reason. Currently she has no pain at all. She states she has had some slight increase in yellow vaginal discharge. She denies any urinary burning may have had some frequency but she was unsure about this. No fevers or chills. She still eating well and no vomiting. She is currently with 1 partner unsure if there has been any sexual transmitted disease exposure but she doubts it. The history is provided by the patient.

## 2023-03-27 NOTE — ED NOTES
I have reviewed discharge instructions with the patient. The patient verbalized understanding. Patient left ED via Discharge Method: ambulatory to Home with self Opportunity for questions and clarification provided. Patient given 0 scripts. Per doesn't want treatment  Without  All test results per [facility] Dr zavala aware    To continue your aftercare when you leave the hospital, you may receive an automated call from our care team to check in on how you are doing. This is a free service and part of our promise to provide the best care and service to meet your aftercare needs.  If you have questions, or wish to unsubscribe from this service please call [phone]. Thank you for Choosing our [facility] Emergency Department.         Tashi Feliciano RN  03/27/23 4198

## 2023-03-27 NOTE — ED TRIAGE NOTES
Lower abd cramping x 3 days; pt reports too early to be period; endorses yellow discharge, denies odor; states she had been working out prior to pain arriving

## 2024-03-05 NOTE — PROGRESS NOTES
with the patient and her questions were answered.      She will complete the following steps:  1.  Complete bariatric labs after dietitian evaluation.  2.  Participation in our behavior modification program, reduced calorie diet program, and participation in our exercise program recommendations supervised by our office.  3.  Complete our required psychological evaluation.   4.  Reminded of policy of no weight gain during prep period.  5.  Upper GI ordered  6.  Physical Therapy Evaluation      She will return after the above are complete for assessment of her progress and schedule surgery.      Time: I spent 60 minutes preparing to see patient (including chart review and preparation), obtaining and/or reviewing additional medical history, performing a physical exam and evaluation, documenting clinical information in the electronic health record, independently interpreting results, communicating results to patient, family or caregiver, and/or coordinating care.        Signed: Radha Mcclelland MD  Bariatric & Minimally Invasive Surgery  3/6/2024

## 2024-03-06 ENCOUNTER — OFFICE VISIT (OUTPATIENT)
Dept: SURGERY | Age: 44
End: 2024-03-06
Payer: MEDICAID

## 2024-03-06 VITALS
DIASTOLIC BLOOD PRESSURE: 89 MMHG | WEIGHT: 243 LBS | HEIGHT: 63 IN | SYSTOLIC BLOOD PRESSURE: 125 MMHG | BODY MASS INDEX: 43.05 KG/M2 | HEART RATE: 87 BPM

## 2024-03-06 DIAGNOSIS — Z87.19 HX OF GASTROESOPHAGEAL REFLUX (GERD): ICD-10-CM

## 2024-03-06 DIAGNOSIS — E66.01 MORBID OBESITY (HCC): Primary | ICD-10-CM

## 2024-03-06 DIAGNOSIS — Z71.89 ENCOUNTER FOR PRE-BARIATRIC SURGERY COUNSELING AND EDUCATION: ICD-10-CM

## 2024-03-06 DIAGNOSIS — Z01.812 ENCOUNTER FOR PRE-OPERATIVE LABORATORY TESTING: ICD-10-CM

## 2024-03-06 DIAGNOSIS — E66.01 OBESITY, CLASS III, BMI 40-49.9 (MORBID OBESITY) (HCC): ICD-10-CM

## 2024-03-06 DIAGNOSIS — Z13.21 ENCOUNTER FOR VITAMIN DEFICIENCY SCREENING: ICD-10-CM

## 2024-03-06 DIAGNOSIS — D50.9 IRON DEFICIENCY ANEMIA, UNSPECIFIED IRON DEFICIENCY ANEMIA TYPE: ICD-10-CM

## 2024-03-06 DIAGNOSIS — I83.90 ASYMPTOMATIC VARICOSE VEINS: ICD-10-CM

## 2024-03-06 DIAGNOSIS — E66.01 MORBID OBESITY (HCC): ICD-10-CM

## 2024-03-06 PROBLEM — D50.0 IRON DEFICIENCY ANEMIA DUE TO CHRONIC BLOOD LOSS: Status: ACTIVE | Noted: 2023-11-07

## 2024-03-06 PROBLEM — I10 ESSENTIAL HYPERTENSION: Status: ACTIVE | Noted: 2019-11-20

## 2024-03-06 PROBLEM — J30.2 SEASONAL ALLERGIC RHINITIS: Status: ACTIVE | Noted: 2024-03-06

## 2024-03-06 PROBLEM — K59.01 SLOW TRANSIT CONSTIPATION: Status: ACTIVE | Noted: 2018-12-13

## 2024-03-06 PROBLEM — J06.9 URI, ACUTE: Status: ACTIVE | Noted: 2019-10-10

## 2024-03-06 LAB
25(OH)D3 SERPL-MCNC: 32.3 NG/ML (ref 30–100)
FERRITIN SERPL-MCNC: 5 NG/ML (ref 8–388)
FOLATE SERPL-MCNC: 9.1 NG/ML (ref 3.1–17.5)
IRON SERPL-MCNC: 21 UG/DL (ref 35–150)
TSH, 3RD GENERATION: 3.04 UIU/ML (ref 0.36–3.74)

## 2024-03-06 PROCEDURE — APPSS45 APP SPLIT SHARED TIME 31-45 MINUTES: Performed by: PHYSICIAN ASSISTANT

## 2024-03-06 PROCEDURE — 3074F SYST BP LT 130 MM HG: CPT | Performed by: SURGERY

## 2024-03-06 PROCEDURE — 3079F DIAST BP 80-89 MM HG: CPT | Performed by: SURGERY

## 2024-03-06 PROCEDURE — 99205 OFFICE O/P NEW HI 60 MIN: CPT | Performed by: SURGERY

## 2024-03-06 RX ORDER — FERROUS SULFATE 325(65) MG
1 TABLET ORAL DAILY
COMMUNITY
Start: 2024-02-18

## 2024-03-07 LAB
EST. AVERAGE GLUCOSE BLD GHB EST-MCNC: 100 MG/DL
HBA1C MFR BLD: 5.1 % (ref 4.8–5.6)
VIT B12 SERPL-MCNC: >6000 PG/ML (ref 193–986)

## 2024-03-11 LAB
COTININE SERPL-MCNC: <1 NG/ML
NICOTINE SERPL-MCNC: <1 NG/ML

## 2024-03-14 ENCOUNTER — HOSPITAL ENCOUNTER (OUTPATIENT)
Dept: PHYSICAL THERAPY | Age: 44
Setting detail: RECURRING SERIES
Discharge: HOME | End: 2024-03-17
Attending: SURGERY
Payer: MEDICAID

## 2024-03-14 PROCEDURE — 97162 PT EVAL MOD COMPLEX 30 MIN: CPT

## 2024-03-14 PROCEDURE — 97110 THERAPEUTIC EXERCISES: CPT

## 2024-03-14 NOTE — THERAPY EVALUATION
Arcelia Brady  : 1980  Primary: [facility] Medicaid (Medicaid Managed)  Secondary:  [facility]  [address] [place] [id number]  Phone: [phone]  Fax: [phone]    Plan of Care/Certification Expiration Date: 24      PT Visit Info:    Plan of Care/Certification Expiration Date: 24  Total # of Visits to Date: 1      Visit Count:  1                OUTPATIENT PHYSICAL THERAPY:             Initial Assessment 3/14/2024               Episode  Appt Desk         Treatment Diagnosis:  Difficulty in walking (R26.2); Generalized muscle weakness (M62.81)   Medical/Referring Diagnosis:  Morbid obesity ([facility]) [E66.01]  Obesity, Class III, BMI 40-49.9 (morbid obesity) (HCC) [E66.01]  Hx of gastroesophageal reflux (GERD) [Z87.19]  Asymptomatic varicose veins [I83.90]  Encounter for pre-operative laboratory testing [Z01.812]  Encounter for vitamin deficiency screening [Z13.21]  Encounter for pre-bariatric surgery counseling and education [Z71.89]  Iron deficiency anemia, unspecified iron deficiency anemia type [D50.9]  Referring Physician:  Radha Sharp MD MD Orders:  PT Eval and Treat   Return MD Appt:  uncertain  Date of Onset:     2023  Allergies:  Ciprofloxacin, Sulfa antibiotics, Penicillins, and Sulfur  Restrictions/Precautions:         Medications Last Reviewed:  3/14/2024     SUBJECTIVE   History of Injury/Illness (Reason for Referral):  Patient is here for the PT portion of the surgical weight loss program.   Patient Stated Goal(s):  \"to learn ex program\"  Initial:   0   /10 Post Session:     0 /10  Past Medical History/Comorbidities:   Ms. Brady  has a past medical history of Fatigue, Lack of energy, Musculoskeletal disorder, Slow transit constipation, Snoring, Unrefreshed by sleep, URI, acute, Varicose veins of legs, and Varicose veins of legs.  Ms. Brady  has a past surgical history that includes

## 2024-03-14 NOTE — PROGRESS NOTES
Arcelia Brady  : 1980  Primary: [facility] Medicaid  Secondary:  [facility] [address]  Phone: [phone]  Fax: [phone]    PT Visit Info:    No data recorded   OT Visit Info:  No data recorded    OUTPATIENT THERAPY: 3/14/2024  Episode  Appt Desk        Arcelia Brady did not show for her initial assessment for today due to unknown reasons.   Thank you,  Veronique Salmon, PT    Future Appointments   Date Time Provider Department Center   3/27/2024 10:00 AM SFE XR RF ROOM 2 SFERAD SFE   4/3/2024  2:30 PM Sherita Sherwood PA CSAE [facility] AMB   2024  3:00 PM Nilsa Haile LISW BSBHPST [facility] AMB

## 2024-04-12 ENCOUNTER — OFFICE VISIT (OUTPATIENT)
Age: 44
End: 2024-04-12
Payer: MEDICAID

## 2024-04-12 VITALS
BODY MASS INDEX: 44.12 KG/M2 | HEART RATE: 89 BPM | DIASTOLIC BLOOD PRESSURE: 92 MMHG | WEIGHT: 249 LBS | SYSTOLIC BLOOD PRESSURE: 151 MMHG | HEIGHT: 63 IN

## 2024-04-12 DIAGNOSIS — Z71.82 EXERCISE COUNSELING: ICD-10-CM

## 2024-04-12 DIAGNOSIS — E66.01 OBESITY, CLASS III, BMI 40-49.9 (MORBID OBESITY) (HCC): ICD-10-CM

## 2024-04-12 DIAGNOSIS — E66.01 MORBID OBESITY (HCC): Primary | ICD-10-CM

## 2024-04-12 DIAGNOSIS — Z87.19 HX OF GASTROESOPHAGEAL REFLUX (GERD): ICD-10-CM

## 2024-04-12 DIAGNOSIS — Z71.3 DIETARY COUNSELING: ICD-10-CM

## 2024-04-12 DIAGNOSIS — D50.9 IRON DEFICIENCY ANEMIA, UNSPECIFIED IRON DEFICIENCY ANEMIA TYPE: ICD-10-CM

## 2024-04-12 DIAGNOSIS — I83.90 ASYMPTOMATIC VARICOSE VEINS: ICD-10-CM
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
## 2024-04-12 PROBLEM — I10 ESSENTIAL HYPERTENSION: Status: RESOLVED | Noted: 2019-11-20 | Resolved: 2024-04-12

## 2024-04-12 PROCEDURE — 99215 OFFICE O/P EST HI 40 MIN: CPT | Performed by: PHYSICIAN ASSISTANT

## 2024-04-12 NOTE — PROGRESS NOTES
[facility] INITIAL ASSESSMENT    Nutrition Assessment:  Anthropometrics:    Ht: 5’3”, wt: 243#, 172% IBW, 43.05 BMI    Macronutrient needs assessment   EER: 9729-8249 kcal/d (14-16 kcal/kg ABW)    MSJ x 1.3-500 = 1750 kcal/d (sedentary AF)   EPR: 64-96g pro (1.0-1.5 gm pro/kg IBW)   CHO: ~218 g CHO/d (50% EER, ~4.5 servings CHO/meal)   H2O: 1mL/kcal or per MD recommendations     Support:  Pt has told grandma and kids - to be good support.  Reminded pt about [facility] support group and encouraged attendance.      Motivation:  High. Reports 4-10 previous weight loss attempts including medication (Phentermine), Weight Watchers, Nutrisystem, nutrition counseling and self-supervised diets with exercise.     Eating Habits:   Eating occasions/d: 3 x daily   Main cook at home: Pts grandma is main cook at home  Restaurant/Fast Food Intake: 2-3 x weekly   Food Allergies: None  Cultural Preferences: None  Typical Beverage Consumption: Water, juice (watered down), coffee  Diet Recall:   Breakfast: 2 eggs with toast  Lunch: Salad with crackers  Dinner: Chicken breast, peas, corn  Snacks:Cheesecake    Lifestyle Assessment:    Hours of sleep/night: ~5-6 hours    Current Occupation:  at post office    Activity during day: Moving around    Number of people living in house and influence: 4 people including pt, grandma and 2 kids (14 y.o and 10 y.o) - to be positive influence.    Exercise Assessment:   PAR-Q: No contraindications to exercise   Medical:     Pain or injuries (present): None     Past surgeries related to mobility: None   Exercise equipment at home: Squat machine   Gym Membership: Yes - [employer]   Current Exercise Routine: Resistant bands and squats for 20min 4 x weekly  Assessment Exercise Goal: Continue current exercise and increase as able    Nutrition Diagnosis:  Morbid obesity R/T excessive energy intake and yoyo dieting as evidenced by BMI = 43.05 and 172 % IBW.      Nutrition Intervention:   Diet Rx - Low-moderate 
sugar-sweetened beverages on the body and encouraged elimination.     Educated on protein counting and encouraged practice.  Educated on protein and vitamin/mineral needs for after sx and encouraged trialing for preference/tolerance.  Educated on pre and post-operative diet.   *Pt verbalizes understanding of information provided during this session.     Nutrition Monitoring and Evaluation:   Monitor for understanding of diet and exercise rx.   Follow up for practicing mindful eating behaviors and meal priority.   Follow with PA for ***  F/U ***    Impression:      Readiness to learn and change: ***   Comprehension level: ***   Anticipated compliance: ***    *** SWL candidate at this time. RD feels as though pt will be able to adhere to post-operative instructions and plan of care. Pt understands the habits that must be in place to support the SWL tool. I look forward to continuing to work with pt.       Sary Kerns MBA, Nutritionist, RD eligible   
available    Penicillins      Other Reaction(s): Not available    Sulfur        SH:  Social History     Tobacco Use    Smoking status: Never    Smokeless tobacco: Never   Substance Use Topics    Alcohol use: Yes    Drug use: No       FH:  Family History   Problem Relation Age of Onset    Hypertension Maternal Grandmother     Hypertension Father     Hypertension Mother           Physical Exam:     BP (!) 151/92   Pulse 89   Ht 1.6 m (5' 3\")   Wt 112.9 kg (249 lb)   LMP 04/12/2024   BMI 44.11 kg/m²     General:  Well-developed, well-nourished, no distress.  Psych:  Cooperative, good insight and judgement.  Neuro:  Alert, oriented to person, place and time.  Lungs:  Unlabored breathing. Symmetrical chest expansion.   Heart:  Regular rate and rhythm.  Abdomen:  Soft, obese, non-tender, non-distended. No guarding or rebound. No palpable masses.     Labs:  All recent labs were reviewed.   Lab Results   Component Value Date    LABA1C 5.1 03/06/2024     Lab Results   Component Value Date    TSH 3.200 06/10/2019      Lab Results   Component Value Date    VITD25 32.3 03/06/2024      Lab Results   Component Value Date    TZXHMABX04 >6000 (H) 03/06/2024      Lab Results   Component Value Date    IRON 21 (L) 03/06/2024    FERRITIN 5 (L) 03/06/2024        Imaging: All images were independently reviewed by me.      Diagnosis Orders   1. Morbid obesity (HCC)        2. Obesity, Class III, BMI 40-49.9 (morbid obesity) (HCC)        3. Hx of gastroesophageal reflux (GERD)        4. Iron deficiency anemia, unspecified iron deficiency anemia type        5. Asymptomatic varicose veins        6. Dietary counseling        7. Exercise counseling            Assessment/Plan:    Arcelia Brady is a 43 y.o. female is here for monthly follow-up visit prior to bariatric surgery with medical co-morbidities related to morbid obesity.    Patient is a good candidate for bariatric surgery and meets NIH criteria for weight loss surgery. In

## 2024-04-16 ENCOUNTER — OFFICE VISIT (OUTPATIENT)
Dept: BEHAVIORAL/MENTAL HEALTH CLINIC | Facility: CLINIC | Age: 44
End: 2024-04-16
Payer: MEDICAID

## 2024-04-16 DIAGNOSIS — E66.01 OBESITY, MORBID, BMI 40.0-49.9 (HCC): ICD-10-CM

## 2024-04-16 DIAGNOSIS — F54 PSYCHOLOGICAL AND BEHAVIORAL FACTORS ASSOCIATED WITH DISORDERS OR DISEASES CLASSIFIED ELSEWHERE: Primary | ICD-10-CM

## 2024-04-16 PROCEDURE — 90791 PSYCH DIAGNOSTIC EVALUATION: CPT | Performed by: SOCIAL WORKER

## 2024-04-17 ENCOUNTER — HOSPITAL ENCOUNTER (OUTPATIENT)
Dept: GENERAL RADIOLOGY | Age: 44
Discharge: HOME | End: 2024-04-20
Attending: SURGERY
Payer: MEDICAID

## 2024-04-17 DIAGNOSIS — Z87.19 HX OF GASTROESOPHAGEAL REFLUX (GERD): ICD-10-CM

## 2024-04-17 DIAGNOSIS — E66.01 MORBID OBESITY (HCC): ICD-10-CM

## 2024-04-17 DIAGNOSIS — I83.90 ASYMPTOMATIC VARICOSE VEINS: ICD-10-CM

## 2024-04-17 DIAGNOSIS — Z01.812 ENCOUNTER FOR PRE-OPERATIVE LABORATORY TESTING: ICD-10-CM

## 2024-04-17 DIAGNOSIS — D50.9 IRON DEFICIENCY ANEMIA, UNSPECIFIED IRON DEFICIENCY ANEMIA TYPE: ICD-10-CM

## 2024-04-17 DIAGNOSIS — E66.01 OBESITY, CLASS III, BMI 40-49.9 (MORBID OBESITY) (HCC): ICD-10-CM

## 2024-04-17 DIAGNOSIS — Z13.21 ENCOUNTER FOR VITAMIN DEFICIENCY SCREENING: ICD-10-CM

## 2024-04-17 DIAGNOSIS — Z71.89 ENCOUNTER FOR PRE-BARIATRIC SURGERY COUNSELING AND EDUCATION: ICD-10-CM

## 2024-04-17 PROCEDURE — 2500000003 HC RX 250 WO HCPCS: Performed by: PHYSICIAN ASSISTANT

## 2024-04-17 PROCEDURE — 6370000000 HC RX 637 (ALT 250 FOR IP): Performed by: PHYSICIAN ASSISTANT

## 2024-04-17 PROCEDURE — 74240 X-RAY XM UPR GI TRC 1CNTRST: CPT

## 2024-04-17 RX ADMIN — BARIUM SULFATE 140 ML: 980 POWDER, FOR SUSPENSION ORAL at 10:26

## 2024-04-17 RX ADMIN — BARIUM SULFATE 1 TABLET: 700 TABLET ORAL at 10:37

## 2024-04-17 RX ADMIN — ANTACID/ANTIFLATULENT 1 EACH: 380; 550; 10; 10 GRANULE, EFFERVESCENT ORAL at 10:26

## 2024-04-17 RX ADMIN — BARIUM SULFATE 355 ML: 0.6 SUSPENSION ORAL at 10:32

## 2024-04-17 ASSESSMENT — PATIENT HEALTH QUESTIONNAIRE - PHQ9
2. FEELING DOWN, DEPRESSED OR HOPELESS: NOT AT ALL
SUM OF ALL RESPONSES TO PHQ9 QUESTIONS 1 & 2: 0
5. POOR APPETITE OR OVEREATING: NOT AT ALL
3. TROUBLE FALLING OR STAYING ASLEEP: NOT AT ALL
9. THOUGHTS THAT YOU WOULD BE BETTER OFF DEAD, OR OF HURTING YOURSELF: NOT AT ALL
1. LITTLE INTEREST OR PLEASURE IN DOING THINGS: NOT AT ALL
SUM OF ALL RESPONSES TO PHQ QUESTIONS 1-9: 0
4. FEELING TIRED OR HAVING LITTLE ENERGY: NOT AT ALL
7. TROUBLE CONCENTRATING ON THINGS, SUCH AS READING THE NEWSPAPER OR WATCHING TELEVISION: NOT AT ALL
8. MOVING OR SPEAKING SO SLOWLY THAT OTHER PEOPLE COULD HAVE NOTICED. OR THE OPPOSITE, BEING SO FIGETY OR RESTLESS THAT YOU HAVE BEEN MOVING AROUND A LOT MORE THAN USUAL: NOT AT ALL
SUM OF ALL RESPONSES TO PHQ QUESTIONS 1-9: 0
SUM OF ALL RESPONSES TO PHQ QUESTIONS 1-9: 0
6. FEELING BAD ABOUT YOURSELF - OR THAT YOU ARE A FAILURE OR HAVE LET YOURSELF OR YOUR FAMILY DOWN: NOT AT ALL
SUM OF ALL RESPONSES TO PHQ QUESTIONS 1-9: 0

## 2024-04-17 ASSESSMENT — ANXIETY QUESTIONNAIRES
5. BEING SO RESTLESS THAT IT IS HARD TO SIT STILL: NOT AT ALL
2. NOT BEING ABLE TO STOP OR CONTROL WORRYING: NOT AT ALL
GAD7 TOTAL SCORE: 0
6. BECOMING EASILY ANNOYED OR IRRITABLE: NOT AT ALL
4. TROUBLE RELAXING: NOT AT ALL
3. WORRYING TOO MUCH ABOUT DIFFERENT THINGS: NOT AT ALL
1. FEELING NERVOUS, ANXIOUS, OR ON EDGE: NOT AT ALL
7. FEELING AFRAID AS IF SOMETHING AWFUL MIGHT HAPPEN: NOT AT ALL

## 2024-04-17 ASSESSMENT — LIFESTYLE VARIABLES
HOW MANY STANDARD DRINKS CONTAINING ALCOHOL DO YOU HAVE ON A TYPICAL DAY: PATIENT DOES NOT DRINK
HOW OFTEN DO YOU HAVE A DRINK CONTAINING ALCOHOL: NEVER

## 2024-04-17 NOTE — PROGRESS NOTES
PCP.  []Medication for behavioral health symptoms recommended- psychiatrist referral recommended, to be seen concurrently.  []Medication for behavioral health symptoms recommended- pt should be evaluated and treated before surgery.  []Continued sobriety is advised with continued adherence to any related services.  []Pt advised to abstain from inhalation of cannabis due to increased health risks.  []Pt advised to abstain from alcohol due to increased health risks.  []Pt is actively suicidal, psychotic or actively using substances.    []Pt advised to develop healthy coping skills that do not involve food.  []Pt advised to stop all nicotine use for at least six months prior to surgery    Diagnosis:    1. Psychological and behavioral factors associated with disorders or diseases classified elsewhere    2. Obesity, morbid, BMI 40.0-49.9 ([facility])        Evaluation Procedures:   [x]Interview:  patient   [x]Review of records  [x]DAST, [x]AUDIT-C, [x]PHQ, [x]JANE, []MDQ,  [x]REALM R/SF  [x]BEDS-7 []Lisa's SE   []OCI, [x]Medical Numbers, []PCL[]ADHD Self Report[]Rapid Mood Screen []CSES  []PTSD-5 []Emotional Eating Questionnaire  Results:  Medical Numbers:  No deficits  REALMS:   No deficits  OCI:    Not administered  PHQ 9:   0     JANE 7:   0     DAST 10:   No drug use  MDQ:    Not administered  AUDIT C:   1  EAT 26:   Not administered  RSE:    Not administered  BEDS-7:   BED not indicated  PCL:    Not administered  ADHD:    Not administered  SHERRIE:    Not administered     CSES:    Not administered  PTSD 5:   Not administered  EEQ    Not administered    Weight Management and Lifestyle History (trajectory, past weight loss attempts, environmental and physiological factors, perceived obstacles):     Prominent Motivating Factor:      [x]Health  []Family    []Self Care  [x]Quality of Life    []Surgery Approval []Fertility  []Insurance  []Appearance  []Med Reduction []More Support  []Pain Mgmt  []Mindset Change  [x]Other attempts

## 2024-05-04 ENCOUNTER — HOSPITAL ENCOUNTER (EMERGENCY)
Age: 44
Discharge: HOME | End: 2024-05-04
Attending: EMERGENCY MEDICINE
Payer: MEDICAID

## 2024-05-04 VITALS
DIASTOLIC BLOOD PRESSURE: 93 MMHG | TEMPERATURE: 98.1 F | BODY MASS INDEX: 42.52 KG/M2 | SYSTOLIC BLOOD PRESSURE: 131 MMHG | WEIGHT: 240 LBS | OXYGEN SATURATION: 100 % | HEART RATE: 84 BPM | RESPIRATION RATE: 16 BRPM | HEIGHT: 63 IN

## 2024-05-04 DIAGNOSIS — B30.9 VIRAL CONJUNCTIVITIS: Primary | ICD-10-CM

## 2024-05-04 PROCEDURE — 6370000000 HC RX 637 (ALT 250 FOR IP): Performed by: EMERGENCY MEDICINE

## 2024-05-04 PROCEDURE — 99283 EMERGENCY DEPT VISIT LOW MDM: CPT

## 2024-05-04 RX ORDER — ERYTHROMYCIN 5 MG/G
OINTMENT OPHTHALMIC
Qty: 3.5 G | Refills: 0 | Status: SHIPPED | OUTPATIENT
Start: 2024-05-04

## 2024-05-04 RX ORDER — TETRACAINE HYDROCHLORIDE 5 MG/ML
1 SOLUTION OPHTHALMIC
Status: COMPLETED | OUTPATIENT
Start: 2024-05-04 | End: 2024-05-04

## 2024-05-04 RX ADMIN — TETRACAINE HYDROCHLORIDE 1 DROP: 5 SOLUTION OPHTHALMIC at 10:33

## 2024-05-04 RX ADMIN — FLUORESCEIN SODIUM 1 MG: 1 STRIP OPHTHALMIC at 10:33

## 2024-05-04 ASSESSMENT — VISUAL ACUITY
OU: 20/25
OD: 20/30
OS: 20/30

## 2024-05-04 ASSESSMENT — ENCOUNTER SYMPTOMS
EYE REDNESS: 1
EYE PAIN: 1
EYE DISCHARGE: 1
COUGH: 0
PHOTOPHOBIA: 0
RHINORRHEA: 0

## 2024-05-04 ASSESSMENT — PAIN - FUNCTIONAL ASSESSMENT
PAIN_FUNCTIONAL_ASSESSMENT: ACTIVITIES ARE NOT PREVENTED
PAIN_FUNCTIONAL_ASSESSMENT: 0-10

## 2024-05-04 ASSESSMENT — PAIN DESCRIPTION - LOCATION: LOCATION: EYE

## 2024-05-04 ASSESSMENT — PAIN SCALES - GENERAL: PAINLEVEL_OUTOF10: 6

## 2024-05-04 ASSESSMENT — PAIN DESCRIPTION - ORIENTATION: ORIENTATION: RIGHT

## 2024-05-04 ASSESSMENT — LIFESTYLE VARIABLES
HOW MANY STANDARD DRINKS CONTAINING ALCOHOL DO YOU HAVE ON A TYPICAL DAY: 1 OR 2
HOW OFTEN DO YOU HAVE A DRINK CONTAINING ALCOHOL: MONTHLY OR LESS

## 2024-05-04 ASSESSMENT — TONOMETRY: OD_IOP_MMHG: 12

## 2024-05-04 ASSESSMENT — PAIN DESCRIPTION - DESCRIPTORS: DESCRIPTORS: ACHING

## 2024-05-04 ASSESSMENT — PAIN DESCRIPTION - PAIN TYPE: TYPE: ACUTE PAIN

## 2024-05-04 NOTE — DISCHARGE INSTRUCTIONS
Antibiotic ointment as prescribed.  Tylenol and Motrin for pain if needed.  Call the eye doctor provided early Monday afternoon if you have not heard from them Monday morning regarding your follow-up appointment.  Return if any new, worsening or concerning symptoms.

## 2024-05-04 NOTE — ED PROVIDER NOTES
Emergency Department Provider Note       PCP: Loyd Wood MD   Age: 43 y.o.   Sex: female     DISPOSITION Decision To Discharge 05/04/2024 11:48:17 AM       ICD-10-CM    1. Viral conjunctivitis  B30.9 French Warren MD, Ophthalmology, [place]          Medical Decision Making     History and exam most consistent with viral conjunctivitis.  Erythromycin antibiotic ointment will be prescribed.  Visual acuity is intact at 20/30 bilaterally.  Intraocular pressure is normal at 12.  No foreign body noted with magnification and no corneal abrasion noted.  Limited eyelid inversion did not reveal any foreign bodies.  Patient did not tolerate this well follow-up with ophthalmology for close follow-up early this week will be provided.  Doubt iritis at this time.     1 acute complicated illness or injury.  Prescription drug management performed.  Shared medical decision making was utilized in creating the patients health plan today.    I independently ordered and reviewed each unique test.                     History     Right eye pain onset yesterday while having her nails done.  No injury reported.  Awoke this morning with eyelids stuck shut from discharge.  No visual disturbance.  No runny nose congestion or cough.          ROS     Review of Systems   Constitutional:  Negative for fever.   HENT:  Negative for congestion and rhinorrhea.    Eyes:  Positive for pain, discharge and redness. Negative for photophobia and visual disturbance.   Respiratory:  Negative for cough.         Physical Exam     Vitals signs and nursing note reviewed:  Vitals:    05/04/24 1022   BP: (!) 131/93   Pulse: 84   Resp: 16   Temp: 98.1 °F (36.7 °C)   TempSrc: Oral   SpO2: 100%   Weight: 108.9 kg (240 lb)   Height: 1.6 m (5' 2.99\")      Physical Exam  Vitals and nursing note reviewed.   Constitutional:       General: She is not in acute distress.     Appearance: She is not ill-appearing.   HENT:      Head: Normocephalic and

## 2024-05-05 NOTE — ED NOTES
This RN accessed pt's chart as pt called dept asking for work note.  Work note provided based on diagnosis.

## 2024-05-24 ENCOUNTER — OFFICE VISIT (OUTPATIENT)
Age: 44
End: 2024-05-24
Payer: MEDICAID

## 2024-05-24 VITALS
SYSTOLIC BLOOD PRESSURE: 156 MMHG | HEART RATE: 86 BPM | WEIGHT: 245 LBS | HEIGHT: 64 IN | DIASTOLIC BLOOD PRESSURE: 88 MMHG | BODY MASS INDEX: 41.83 KG/M2

## 2024-05-24 DIAGNOSIS — Z71.3 DIETARY COUNSELING: ICD-10-CM

## 2024-05-24 DIAGNOSIS — D50.9 IRON DEFICIENCY ANEMIA, UNSPECIFIED IRON DEFICIENCY ANEMIA TYPE: ICD-10-CM

## 2024-05-24 DIAGNOSIS — I83.90 ASYMPTOMATIC VARICOSE VEINS: ICD-10-CM

## 2024-05-24 DIAGNOSIS — E66.01 OBESITY, CLASS III, BMI 40-49.9 (MORBID OBESITY) (HCC): ICD-10-CM

## 2024-05-24 DIAGNOSIS — E66.01 MORBID OBESITY (HCC): Primary | ICD-10-CM

## 2024-05-24 DIAGNOSIS — Z71.82 EXERCISE COUNSELING: ICD-10-CM

## 2024-05-24 DIAGNOSIS — Z87.19 HX OF GASTROESOPHAGEAL REFLUX (GERD): ICD-10-CM

## 2024-05-24 PROCEDURE — 99214 OFFICE O/P EST MOD 30 MIN: CPT | Performed by: PHYSICIAN ASSISTANT

## 2024-05-24 NOTE — PROGRESS NOTES
NUTRITION REASSESSMENT    Evaluation:    Pt reports good dietary compliance over the past month.  Pt is eating at least 3x/d.  Pt reports good compliance with mindful eating behaviors. Pt is practicing not drinking with meals- 20min. Pt is exercising via walking and resistant bands for 15-20min 3-4 x weekly. Pt has has found acceptable dietary supplements for use after surgery. Pt will have coffee eliminated by Pre-op.   Diet Recall:  Breakfast: 2 eggs and toast  Lunch: Protein shake, banana, and muffin  Dinner: Salad with chicken fingers    Intervention:    Continue with current diet rx.  Discussed exploring and trialing nutritional supplements for pre/post-op care.  Educated on pre and post-operative diet.     *Pt verbalizes understanding of information provided during this session.     Impression:      Readiness to learn and change: Fair   Comprehension level: Fair   Anticipated compliance: Fair     Fair SWL candidate at this time.  Pt has made good changes during supervised diet with RD. RD feels as though pt will be able to adhere to post-operative instructions and plan of care. Pt understands the habits that must be in place to support the SWL tool. I look forward to continuing to work with pt.     Sary Kerns MBA. NORMA, LD   
for this visit.       ALLERGIES:    Allergies   Allergen Reactions    Ciprofloxacin Hives     Other Reaction(s): Hives/Swelling-Allergy    Sulfa Antibiotics Hives     redness    Other Reaction(s): Hives/Swelling-Allergy    Other Reaction(s): Not available    Penicillins      Other Reaction(s): Not available    Sulfur        SH:  Social History     Tobacco Use    Smoking status: Never    Smokeless tobacco: Never   Substance Use Topics    Alcohol use: Yes    Drug use: No       FH:  Family History   Problem Relation Age of Onset    Hypertension Maternal Grandmother     Hypertension Father     Hypertension Mother           Physical Exam:     BP (!) 156/88   Pulse 86   Ht 1.626 m (5' 4\")   Wt 111.1 kg (245 lb)   LMP 05/09/2024   BMI 42.05 kg/m²     General:  Well-developed, well-nourished, no distress.  Psych:  Cooperative, good insight and judgement.  Neuro:  Alert, oriented to person, place and time.  Lungs:  Unlabored breathing. Symmetrical chest expansion.   Heart:  Regular rate and rhythm.  Abdomen:  Soft, obese, non-tender, non-distended. No guarding or rebound. No palpable masses.     Labs:  All recent labs were reviewed.   Lab Results   Component Value Date    LABA1C 5.1 03/06/2024     Lab Results   Component Value Date    TSH 3.200 06/10/2019      Lab Results   Component Value Date    VITD25 32.3 03/06/2024      Lab Results   Component Value Date    WZORULTG12 >6000 (H) 03/06/2024      Lab Results   Component Value Date    IRON 21 (L) 03/06/2024    FERRITIN 5 (L) 03/06/2024        Imaging: All images were independently reviewed by me.     Diagnosis Orders   1. Morbid obesity (HCC)        2. Obesity, Class III, BMI 40-49.9 (morbid obesity) (HCC)        3. Hx of gastroesophageal reflux (GERD)        4. Iron deficiency anemia, unspecified iron deficiency anemia type        5. Asymptomatic varicose veins        6. Dietary counseling        7. Exercise counseling            Assessment/Plan:    Arcelia Greer

## 2024-05-29 NOTE — PROGRESS NOTES
Radha Mcclelland MD   Bariatric & Advanced Laparoscopic Surgery & Endoscopy  [address], [address]  [place] [address]  Phone [phone]   Fax [phone]      Date of visit: 2024      Primary/Requesting provider: Loyd Wood MD         Name: Arcelia Brady      MRN: [id number]       : 1980       Age: 43 y.o.    Sex: female        PCP: Loyd Wood MD     CC:    Chief Complaint   Patient presents with    Follow-up     BARIATRIC PREOP - pre-op VSG         Procedure: robotic assisted sleeve gastrectomy    Surgical Consult Date: 2024    Surgical Date: TBD     The patient is a 43 y.o. female presenting with a height of 1.626 m (5' 4\") and weight of 110.7 kg (244 lb), giving her a Body mass index is 41.88 kg/m².   She has an ideal body weight of 141 lbs, and excess body weight of 103 lbs.   She started our program with a weight of 243 lbs, gaining 1 lb.    She has completed all aspects of our prep program and has been deemed an acceptable candidate for bariatric surgery.    30-Day Bariatric Surgical Risk Percentage: 2.65%    PSYCHOLOGICAL EVALUATION:   Completed, 2024 with Nilsa Haile deeming her an acceptable candidate     DIETITIAN EVALUATION:  Completed with Sary Kerns deeming her an appropriate surgical candidate.    BARIATRIC LABS:  Completed, 2024 (vitamin B12 > 6000, iron 21, ferritin 5)    UPPER GI:  Completed, 2024  IMPRESSION:  1. Minimal gastroesophageal reflux was observed during the examination.  2. No evidence of esophagitis, hiatal hernia or esophageal stricture.  3. No evidence of gastritis or duodenal ulcer.    PHYSICAL THERAPY EVALUATION FOR MOBILITY OPTIMIZATION:  Completed, 2024    We have reviewed the procedure again today and completed our standard pre op teaching. Her questions were answered and she is ready to schedule surgery.    We have discussed the procedure, diet and exercise regimens, and

## 2024-05-29 NOTE — H&P (VIEW-ONLY)
Radha Mcclelland MD   Bariatric & Advanced Laparoscopic Surgery & Endoscopy  [address], [address]  [place] [address]  Phone [phone]   Fax [phone]      Date of visit: 2024      Primary/Requesting provider: Loyd Wood MD         Name: Arcelia Brady      MRN: [id number]       : 1980       Age: 43 y.o.    Sex: female        PCP: Loyd Wood MD     CC:    Chief Complaint   Patient presents with    Follow-up     BARIATRIC PREOP - pre-op VSG         Procedure: robotic assisted sleeve gastrectomy    Surgical Consult Date: 2024    Surgical Date: TBD     The patient is a 43 y.o. female presenting with a height of 1.626 m (5' 4\") and weight of 110.7 kg (244 lb), giving her a Body mass index is 41.88 kg/m².   She has an ideal body weight of 141 lbs, and excess body weight of 103 lbs.   She started our program with a weight of 243 lbs, gaining 1 lb.    She has completed all aspects of our prep program and has been deemed an acceptable candidate for bariatric surgery.    30-Day Bariatric Surgical Risk Percentage: 2.65%    PSYCHOLOGICAL EVALUATION:   Completed, 2024 with Nilsa Haile deeming her an acceptable candidate     DIETITIAN EVALUATION:  Completed with Sary Kerns deeming her an appropriate surgical candidate.    BARIATRIC LABS:  Completed, 2024 (vitamin B12 > 6000, iron 21, ferritin 5)    UPPER GI:  Completed, 2024  IMPRESSION:  1. Minimal gastroesophageal reflux was observed during the examination.  2. No evidence of esophagitis, hiatal hernia or esophageal stricture.  3. No evidence of gastritis or duodenal ulcer.    PHYSICAL THERAPY EVALUATION FOR MOBILITY OPTIMIZATION:  Completed, 2024    We have reviewed the procedure again today and completed our standard pre op teaching. Her questions were answered and she is ready to schedule surgery.    We have discussed the procedure, diet and exercise regimens, and  potential complications of surgery.  The patient understands the nature and potential complication of surgery and has the capacity to follow the post operative diet, exercise, and nutritional requirements.  After review, she has signed her surgical consent today.      MEDICAL HISTORY:  Morbid Obesity  Iron deficiency anemia - on iron  Chronic constipation     Comorbidity Yes or No   Hypertension No   Hyperlipidemia No   Diabetes Mellitus No   Coronary Artery Disease No   Gastroesophageal Reflux No   Obstructive Sleep Apnea No   Cancer No   Asthma No   Osteoarthritis No   Joint Pain No      Admits to rare GERD symptoms including heartburn with spicy foods, but denies regurgitation or dysphagia. Reports no previous medication, no previous EGD or testing.     Other Yes or No   Venous Stasis Yes, no DVT or PE   Dialysis No   Long term use of steroid or immunocompromised conditions No   On Anticoagulants No         DENTAL/CHEWING ABILITY:  No dental issues with chewing.      PRIOR WEIGHT LOSS ATTEMPTS:  Reports 4-10 previous weight loss attempts including medication (Phentermine), Weight Watchers, Nutrisystem, nutrition counseling and self-supervised diets with exercise.     EXERCISE ASSESSMENT:  Reports current physical activity regimen twice per week. Reviewed NIH guidelines.      PSYCHOSOCIAL:  She notes she is  and states main support person is her grandmother, Renetta Eric.  She is employed as a  at [employer].  Her goal in pursuing surgical weight loss is to improve health.  She denies any active psychological problems and denies history of depression or anxiety.  She states she is independent in her care, can drive a car, and can ambulate without assistance.       Patient has a long term history of obesity with multiple failed attempts at weight reduction.  Patient denies any changes in health history or physical health since last visit.  Patient has been adherent to her diet and exercise regimen.

## 2024-05-30 ENCOUNTER — OFFICE VISIT (OUTPATIENT)
Dept: SURGERY | Age: 44
End: 2024-05-30
Payer: MEDICAID

## 2024-05-30 ENCOUNTER — PREP FOR PROCEDURE (OUTPATIENT)
Dept: SURGERY | Age: 44
End: 2024-05-30

## 2024-05-30 VITALS
HEART RATE: 94 BPM | WEIGHT: 244 LBS | SYSTOLIC BLOOD PRESSURE: 139 MMHG | BODY MASS INDEX: 41.66 KG/M2 | DIASTOLIC BLOOD PRESSURE: 87 MMHG | HEIGHT: 64 IN

## 2024-05-30 DIAGNOSIS — E66.01 MORBID OBESITY (HCC): Primary | ICD-10-CM

## 2024-05-30 DIAGNOSIS — E66.01 OBESITY, CLASS III, BMI 40-49.9 (MORBID OBESITY) (HCC): ICD-10-CM

## 2024-05-30 DIAGNOSIS — G89.18 POST-OPERATIVE PAIN: ICD-10-CM

## 2024-05-30 DIAGNOSIS — R11.2 POST-OPERATIVE NAUSEA AND VOMITING: ICD-10-CM

## 2024-05-30 DIAGNOSIS — E66.01 MORBID OBESITY (HCC): ICD-10-CM

## 2024-05-30 DIAGNOSIS — D50.9 IRON DEFICIENCY ANEMIA, UNSPECIFIED IRON DEFICIENCY ANEMIA TYPE: ICD-10-CM

## 2024-05-30 DIAGNOSIS — I83.90 ASYMPTOMATIC VARICOSE VEINS: ICD-10-CM

## 2024-05-30 DIAGNOSIS — Z98.890 POST-OPERATIVE NAUSEA AND VOMITING: ICD-10-CM

## 2024-05-30 DIAGNOSIS — Z87.19 HX OF GASTROESOPHAGEAL REFLUX (GERD): ICD-10-CM

## 2024-05-30 PROCEDURE — APPSS45 APP SPLIT SHARED TIME 31-45 MINUTES: Performed by: PHYSICIAN ASSISTANT

## 2024-05-30 PROCEDURE — 99215 OFFICE O/P EST HI 40 MIN: CPT | Performed by: SURGERY

## 2024-05-30 RX ORDER — OMEPRAZOLE 40 MG/1
40 CAPSULE, DELAYED RELEASE ORAL
Qty: 90 CAPSULE | Refills: 0 | Status: SHIPPED | OUTPATIENT
Start: 2024-05-30

## 2024-05-30 RX ORDER — ONDANSETRON 4 MG/1
4 TABLET, FILM COATED ORAL 3 TIMES DAILY PRN
Qty: 30 TABLET | Refills: 0 | Status: SHIPPED | OUTPATIENT
Start: 2024-05-30

## 2024-05-30 RX ORDER — OXYCODONE HYDROCHLORIDE AND ACETAMINOPHEN 5; 325 MG/1; MG/1
1 TABLET ORAL EVERY 6 HOURS PRN
Qty: 20 TABLET | Refills: 0 | Status: SHIPPED | OUTPATIENT
Start: 2024-05-30 | End: 2024-06-04

## 2024-06-12 RX ORDER — SODIUM CHLORIDE 0.9 % (FLUSH) 0.9 %
5-40 SYRINGE (ML) INJECTION EVERY 12 HOURS SCHEDULED
Status: CANCELLED | OUTPATIENT
Start: 2024-06-12

## 2024-06-12 RX ORDER — SODIUM CHLORIDE 0.9 % (FLUSH) 0.9 %
5-40 SYRINGE (ML) INJECTION PRN
Status: CANCELLED | OUTPATIENT
Start: 2024-06-12

## 2024-06-12 RX ORDER — SODIUM CHLORIDE, SODIUM LACTATE, POTASSIUM CHLORIDE, CALCIUM CHLORIDE 600; 310; 30; 20 MG/100ML; MG/100ML; MG/100ML; MG/100ML
INJECTION, SOLUTION INTRAVENOUS CONTINUOUS
Status: CANCELLED | OUTPATIENT
Start: 2024-06-12

## 2024-06-12 RX ORDER — ONDANSETRON 2 MG/ML
4 INJECTION INTRAMUSCULAR; INTRAVENOUS ONCE
Status: CANCELLED | OUTPATIENT
Start: 2024-06-12 | End: 2024-06-12

## 2024-06-12 RX ORDER — SODIUM CHLORIDE 9 MG/ML
INJECTION, SOLUTION INTRAVENOUS PRN
Status: CANCELLED | OUTPATIENT
Start: 2024-06-12

## 2024-06-13 ENCOUNTER — HOSPITAL ENCOUNTER (OUTPATIENT)
Dept: SURGERY | Age: 44
Discharge: HOME | End: 2024-06-13
Payer: MEDICAID

## 2024-06-13 VITALS
DIASTOLIC BLOOD PRESSURE: 83 MMHG | TEMPERATURE: 98 F | HEART RATE: 68 BPM | SYSTOLIC BLOOD PRESSURE: 133 MMHG | OXYGEN SATURATION: 100 % | WEIGHT: 244.7 LBS | BODY MASS INDEX: 41.77 KG/M2 | RESPIRATION RATE: 18 BRPM | HEIGHT: 64 IN

## 2024-06-13 LAB
ANION GAP SERPL CALC-SCNC: 8 MMOL/L (ref 9–18)
BUN SERPL-MCNC: 18 MG/DL (ref 6–23)
CALCIUM SERPL-MCNC: 8.8 MG/DL (ref 8.8–10.2)
CHLORIDE SERPL-SCNC: 105 MMOL/L (ref 98–107)
CO2 SERPL-SCNC: 25 MMOL/L (ref 20–28)
CREAT SERPL-MCNC: 0.57 MG/DL (ref 0.6–1.1)
ERYTHROCYTE [DISTWIDTH] IN BLOOD BY AUTOMATED COUNT: 14.3 % (ref 11.9–14.6)
GLUCOSE SERPL-MCNC: 93 MG/DL (ref 70–99)
HCT VFR BLD AUTO: 33.8 % (ref 35.8–46.3)
HGB BLD-MCNC: 10 G/DL (ref 11.7–15.4)
MCH RBC QN AUTO: 24.4 PG (ref 26.1–32.9)
MCHC RBC AUTO-ENTMCNC: 29.6 G/DL (ref 31.4–35)
MCV RBC AUTO: 82.4 FL (ref 82–102)
NRBC # BLD: 0 K/UL (ref 0–0.2)
PLATELET # BLD AUTO: 363 K/UL (ref 150–450)
PMV BLD AUTO: 8.8 FL (ref 9.4–12.3)
POTASSIUM SERPL-SCNC: 4.6 MMOL/L (ref 3.5–5.1)
RBC # BLD AUTO: 4.1 M/UL (ref 4.05–5.2)
SODIUM SERPL-SCNC: 138 MMOL/L (ref 136–145)
WBC # BLD AUTO: 7.9 K/UL (ref 4.3–11.1)

## 2024-06-13 PROCEDURE — 85027 COMPLETE CBC AUTOMATED: CPT

## 2024-06-13 PROCEDURE — 80048 BASIC METABOLIC PNL TOTAL CA: CPT

## 2024-06-13 ASSESSMENT — PAIN SCALES - GENERAL: PAINLEVEL_OUTOF10: 0

## 2024-06-13 NOTE — PERIOP NOTE
PLEASE CONTINUE TAKING ALL PRESCRIPTION MEDICATIONS UP TO THE DAY OF SURGERY UNLESS OTHERWISE DIRECTED BELOW. You may take Tylenol, allergy, and/or indigestion medications.     TAKE ONLY THESE MEDICATIONS ON THE DAY OF SURGERY ON 6/27/24      NONE           DISCONTINUE all vitamins, herbals, and supplements 7 days prior to surgery. DISCONTINUE Non-Steroidal Anti-Inflammatory (NSAIDS) such as Advil, Ibuprofen, Motrin, Aspirin, Naproxen, and Aleve 5 days prior to surgery unless instructed to hold longer by surgeon.     Home Medications to Hold- please continue all other medications except these.            Comments     *can take Iron up until the day before surgery*      On the day before surgery (6/26/24) please take 2 Tylenol in the morning and then again before bed. You may use either regular or extra strength.      Bring: Photo ID, Insurance card, Erna-hex soap, Incentive Spirometer        Please do not bring home medications with you on the day of surgery unless otherwise directed by your nurse.  If you are instructed to bring home medications, please give them to your nurse as they will be administered by the nursing staff.    If you have any questions, please call [facility] [phone].    A copy of this note was provided to the patient for reference.

## 2024-06-13 NOTE — PERIOP NOTE
SURGICAL WEIGHT LOSS Enhanced Recovery After Surgery: diabetic and non-diabetic patients      The evening before surgery on 6/26/24, drink 1 bottles of the Ensure Pre-Surgery drink.     Please do not eat or drink after midnight the night before your surgery.     Bring your patient handbook with you to the hospital.        Things to Remember:      1. You will be up in a chair the evening of surgery and sipping on clear liquids, once released by your surgeon.       2. Beginning the day of surgery, you will be out of the bed as able, once released by your hospital team. We encourage you to be sitting up in a chair, walking in the hurt, doing exercises as advised by physical therapy, etc.       3. You will be given scheduled non-narcotic pain medication to help keep your pain under control.  You will have stronger pain medication ordered for break through pain.      4. All of these measures are geared toward improving your overall surgical recovery and   decreasing the risk of complications.

## 2024-06-13 NOTE — PERIOP NOTE
Patient verified name and     Order for consent found in EHR and matches case posting; patient verified.     Type 3 surgery, walk in assessment complete.    Labs per surgeon: no labs found under PAT phase of care.  Labs per anesthesia protocol: CBC, BMP; results pending. T&S to be collected dos.   EKG: not needed per anesthesia protocol.       Patient provided with and instructed on educational handouts including Guide to Surgery, Preventing Surgical Site Infections, Pain Management, and [facility] Brochure.    Patient answered medical/surgical history questions at their best of ability. All prior to admission medications documented in EPIC. Original medication prescription bottles NOT visualized during patient appointment.     Patient instructed to hold all vitamins 7 days prior to surgery and NSAIDS 5 days prior to surgery, patient verbalized understanding.     Patient teach back successful and patient demonstrates knowledge of instructions.

## 2024-06-26 RX ORDER — SODIUM CHLORIDE 0.9 % (FLUSH) 0.9 %
5-40 SYRINGE (ML) INJECTION PRN
Status: CANCELLED | OUTPATIENT
Start: 2024-06-26

## 2024-06-26 RX ORDER — SODIUM CHLORIDE 9 MG/ML
INJECTION, SOLUTION INTRAVENOUS PRN
Status: CANCELLED | OUTPATIENT
Start: 2024-06-26

## 2024-06-26 RX ORDER — SODIUM CHLORIDE 0.9 % (FLUSH) 0.9 %
5-40 SYRINGE (ML) INJECTION EVERY 12 HOURS SCHEDULED
Status: CANCELLED | OUTPATIENT
Start: 2024-06-26

## 2024-06-27 ENCOUNTER — HOSPITAL ENCOUNTER (OUTPATIENT)
Age: 44
Setting detail: OBSERVATION
Discharge: HOME | End: 2024-06-28
Attending: SURGERY | Admitting: SURGERY
Payer: MEDICAID

## 2024-06-27 ENCOUNTER — ANESTHESIA (OUTPATIENT)
Dept: SURGERY | Age: 44
End: 2024-06-27
Payer: MEDICAID

## 2024-06-27 ENCOUNTER — ANESTHESIA EVENT (OUTPATIENT)
Dept: SURGERY | Age: 44
End: 2024-06-27
Payer: MEDICAID

## 2024-06-27 LAB
ABO + RH BLD: NORMAL
BLOOD GROUP ANTIBODIES SERPL: NORMAL
GLUCOSE BLD STRIP.AUTO-MCNC: 120 MG/DL (ref 65–100)
HCG UR QL: NEGATIVE
SERVICE CMNT-IMP: ABNORMAL
SPECIMEN EXP DATE BLD: NORMAL

## 2024-06-27 PROCEDURE — 2580000003 HC RX 258

## 2024-06-27 PROCEDURE — 2500000003 HC RX 250 WO HCPCS: Performed by: ANESTHESIOLOGY

## 2024-06-27 PROCEDURE — 6360000002 HC RX W HCPCS

## 2024-06-27 PROCEDURE — 2709999900 HC NON-CHARGEABLE SUPPLY: Performed by: SURGERY

## 2024-06-27 PROCEDURE — 97530 THERAPEUTIC ACTIVITIES: CPT

## 2024-06-27 PROCEDURE — 2580000003 HC RX 258: Performed by: ANESTHESIOLOGY

## 2024-06-27 PROCEDURE — 2580000003 HC RX 258: Performed by: PHYSICIAN ASSISTANT

## 2024-06-27 PROCEDURE — 6370000000 HC RX 637 (ALT 250 FOR IP): Performed by: PHYSICIAN ASSISTANT

## 2024-06-27 PROCEDURE — C9113 INJ PANTOPRAZOLE SODIUM, VIA: HCPCS | Performed by: PHYSICIAN ASSISTANT

## 2024-06-27 PROCEDURE — S2900 ROBOTIC SURGICAL SYSTEM: HCPCS | Performed by: SURGERY

## 2024-06-27 PROCEDURE — 86900 BLOOD TYPING SEROLOGIC ABO: CPT

## 2024-06-27 PROCEDURE — 7100000000 HC PACU RECOVERY - FIRST 15 MIN: Performed by: SURGERY

## 2024-06-27 PROCEDURE — 6360000002 HC RX W HCPCS: Performed by: PHYSICIAN ASSISTANT

## 2024-06-27 PROCEDURE — 88312 SPECIAL STAINS GROUP 1: CPT

## 2024-06-27 PROCEDURE — 6370000000 HC RX 637 (ALT 250 FOR IP): Performed by: ANESTHESIOLOGY

## 2024-06-27 PROCEDURE — 2720000010 HC SURG SUPPLY STERILE: Performed by: SURGERY

## 2024-06-27 PROCEDURE — 6360000002 HC RX W HCPCS: Performed by: SURGERY

## 2024-06-27 PROCEDURE — 86850 RBC ANTIBODY SCREEN: CPT

## 2024-06-27 PROCEDURE — G0378 HOSPITAL OBSERVATION PER HR: HCPCS

## 2024-06-27 PROCEDURE — 96372 THER/PROPH/DIAG INJ SC/IM: CPT

## 2024-06-27 PROCEDURE — 7100000001 HC PACU RECOVERY - ADDTL 15 MIN: Performed by: SURGERY

## 2024-06-27 PROCEDURE — 6360000002 HC RX W HCPCS: Performed by: ANESTHESIOLOGY

## 2024-06-27 PROCEDURE — 97161 PT EVAL LOW COMPLEX 20 MIN: CPT

## 2024-06-27 PROCEDURE — 88307 TISSUE EXAM BY PATHOLOGIST: CPT

## 2024-06-27 PROCEDURE — 3600000019 HC SURGERY ROBOT ADDTL 15MIN: Performed by: SURGERY

## 2024-06-27 PROCEDURE — 82962 GLUCOSE BLOOD TEST: CPT

## 2024-06-27 PROCEDURE — 3700000000 HC ANESTHESIA ATTENDED CARE: Performed by: SURGERY

## 2024-06-27 PROCEDURE — 3600000009 HC SURGERY ROBOT BASE: Performed by: SURGERY

## 2024-06-27 PROCEDURE — 2500000003 HC RX 250 WO HCPCS

## 2024-06-27 PROCEDURE — 2500000003 HC RX 250 WO HCPCS: Performed by: PHYSICIAN ASSISTANT

## 2024-06-27 PROCEDURE — 86901 BLOOD TYPING SEROLOGIC RH(D): CPT

## 2024-06-27 PROCEDURE — 3700000001 HC ADD 15 MINUTES (ANESTHESIA): Performed by: SURGERY

## 2024-06-27 PROCEDURE — 81025 URINE PREGNANCY TEST: CPT

## 2024-06-27 RX ORDER — OXYCODONE HYDROCHLORIDE 5 MG/1
5 TABLET ORAL EVERY 4 HOURS PRN
Status: DISCONTINUED | OUTPATIENT
Start: 2024-06-27 | End: 2024-06-28 | Stop reason: HOSPADM

## 2024-06-27 RX ORDER — MIDAZOLAM HYDROCHLORIDE 1 MG/ML
INJECTION INTRAMUSCULAR; INTRAVENOUS PRN
Status: DISCONTINUED | OUTPATIENT
Start: 2024-06-27 | End: 2024-06-27 | Stop reason: SDUPTHER

## 2024-06-27 RX ORDER — ONDANSETRON 2 MG/ML
4 INJECTION INTRAMUSCULAR; INTRAVENOUS
Status: COMPLETED | OUTPATIENT
Start: 2024-06-27 | End: 2024-06-27

## 2024-06-27 RX ORDER — SODIUM CHLORIDE 0.9 % (FLUSH) 0.9 %
5-40 SYRINGE (ML) INJECTION PRN
Status: DISCONTINUED | OUTPATIENT
Start: 2024-06-27 | End: 2024-06-28 | Stop reason: HOSPADM

## 2024-06-27 RX ORDER — NALOXONE HYDROCHLORIDE 0.4 MG/ML
INJECTION, SOLUTION INTRAMUSCULAR; INTRAVENOUS; SUBCUTANEOUS PRN
Status: DISCONTINUED | OUTPATIENT
Start: 2024-06-27 | End: 2024-06-27 | Stop reason: HOSPADM

## 2024-06-27 RX ORDER — HEPARIN SODIUM 5000 [USP'U]/ML
5000 INJECTION, SOLUTION INTRAVENOUS; SUBCUTANEOUS EVERY 8 HOURS SCHEDULED
Status: DISCONTINUED | OUTPATIENT
Start: 2024-06-27 | End: 2024-06-28 | Stop reason: HOSPADM

## 2024-06-27 RX ORDER — HYDRALAZINE HYDROCHLORIDE 20 MG/ML
10 INJECTION INTRAMUSCULAR; INTRAVENOUS EVERY 6 HOURS PRN
Status: DISCONTINUED | OUTPATIENT
Start: 2024-06-27 | End: 2024-06-28 | Stop reason: HOSPADM

## 2024-06-27 RX ORDER — SCOLOPAMINE TRANSDERMAL SYSTEM 1 MG/1
1 PATCH, EXTENDED RELEASE TRANSDERMAL
Status: DISCONTINUED | OUTPATIENT
Start: 2024-06-27 | End: 2024-06-28 | Stop reason: HOSPADM

## 2024-06-27 RX ORDER — KETOROLAC TROMETHAMINE 30 MG/ML
30 INJECTION, SOLUTION INTRAMUSCULAR; INTRAVENOUS EVERY 6 HOURS
Status: COMPLETED | OUTPATIENT
Start: 2024-06-27 | End: 2024-06-27

## 2024-06-27 RX ORDER — FENTANYL CITRATE 50 UG/ML
INJECTION, SOLUTION INTRAMUSCULAR; INTRAVENOUS PRN
Status: DISCONTINUED | OUTPATIENT
Start: 2024-06-27 | End: 2024-06-27 | Stop reason: SDUPTHER

## 2024-06-27 RX ORDER — SUCRALFATE 1 G/1
1 TABLET ORAL EVERY 6 HOURS SCHEDULED
Status: DISCONTINUED | OUTPATIENT
Start: 2024-06-27 | End: 2024-06-28 | Stop reason: HOSPADM

## 2024-06-27 RX ORDER — NEOSTIGMINE METHYLSULFATE 1 MG/ML
INJECTION, SOLUTION INTRAVENOUS PRN
Status: DISCONTINUED | OUTPATIENT
Start: 2024-06-27 | End: 2024-06-27 | Stop reason: SDUPTHER

## 2024-06-27 RX ORDER — ONDANSETRON 2 MG/ML
4 INJECTION INTRAMUSCULAR; INTRAVENOUS EVERY 6 HOURS PRN
Status: DISCONTINUED | OUTPATIENT
Start: 2024-06-27 | End: 2024-06-28 | Stop reason: HOSPADM

## 2024-06-27 RX ORDER — HYDROMORPHONE HYDROCHLORIDE 2 MG/ML
INJECTION, SOLUTION INTRAMUSCULAR; INTRAVENOUS; SUBCUTANEOUS PRN
Status: DISCONTINUED | OUTPATIENT
Start: 2024-06-27 | End: 2024-06-27 | Stop reason: SDUPTHER

## 2024-06-27 RX ORDER — SODIUM CHLORIDE 9 MG/ML
INJECTION, SOLUTION INTRAVENOUS PRN
Status: DISCONTINUED | OUTPATIENT
Start: 2024-06-27 | End: 2024-06-28 | Stop reason: HOSPADM

## 2024-06-27 RX ORDER — LIDOCAINE HYDROCHLORIDE 20 MG/ML
INJECTION, SOLUTION EPIDURAL; INFILTRATION; INTRACAUDAL; PERINEURAL PRN
Status: DISCONTINUED | OUTPATIENT
Start: 2024-06-27 | End: 2024-06-27 | Stop reason: SDUPTHER

## 2024-06-27 RX ORDER — GABAPENTIN 300 MG/1
300 CAPSULE ORAL 3 TIMES DAILY
Status: DISCONTINUED | OUTPATIENT
Start: 2024-06-27 | End: 2024-06-28 | Stop reason: HOSPADM

## 2024-06-27 RX ORDER — PROPOFOL 10 MG/ML
INJECTION, EMULSION INTRAVENOUS PRN
Status: DISCONTINUED | OUTPATIENT
Start: 2024-06-27 | End: 2024-06-27 | Stop reason: SDUPTHER

## 2024-06-27 RX ORDER — LIDOCAINE HYDROCHLORIDE ANHYDROUS AND DEXTROSE MONOHYDRATE 5; 400 G/100ML; MG/100ML
1 INJECTION, SOLUTION INTRAVENOUS CONTINUOUS
Status: ACTIVE | OUTPATIENT
Start: 2024-06-27 | End: 2024-06-28

## 2024-06-27 RX ORDER — LIDOCAINE HYDROCHLORIDE ANHYDROUS AND DEXTROSE MONOHYDRATE 5; 400 G/100ML; MG/100ML
INJECTION, SOLUTION INTRAVENOUS CONTINUOUS PRN
Status: DISCONTINUED | OUTPATIENT
Start: 2024-06-27 | End: 2024-06-27 | Stop reason: SDUPTHER

## 2024-06-27 RX ORDER — SCOLOPAMINE TRANSDERMAL SYSTEM 1 MG/1
1 PATCH, EXTENDED RELEASE TRANSDERMAL
Status: DISCONTINUED | OUTPATIENT
Start: 2024-06-27 | End: 2024-06-27 | Stop reason: SDUPTHER

## 2024-06-27 RX ORDER — ROCURONIUM BROMIDE 10 MG/ML
INJECTION, SOLUTION INTRAVENOUS PRN
Status: DISCONTINUED | OUTPATIENT
Start: 2024-06-27 | End: 2024-06-27 | Stop reason: SDUPTHER

## 2024-06-27 RX ORDER — KETAMINE HYDROCHLORIDE 50 MG/ML
INJECTION, SOLUTION INTRAMUSCULAR; INTRAVENOUS PRN
Status: DISCONTINUED | OUTPATIENT
Start: 2024-06-27 | End: 2024-06-27 | Stop reason: SDUPTHER

## 2024-06-27 RX ORDER — PROCHLORPERAZINE EDISYLATE 5 MG/ML
10 INJECTION INTRAMUSCULAR; INTRAVENOUS EVERY 6 HOURS PRN
Status: DISCONTINUED | OUTPATIENT
Start: 2024-06-27 | End: 2024-06-28 | Stop reason: HOSPADM

## 2024-06-27 RX ORDER — HYDROMORPHONE HYDROCHLORIDE 1 MG/ML
0.5 INJECTION, SOLUTION INTRAMUSCULAR; INTRAVENOUS; SUBCUTANEOUS
Status: DISCONTINUED | OUTPATIENT
Start: 2024-06-27 | End: 2024-06-28 | Stop reason: HOSPADM

## 2024-06-27 RX ORDER — SODIUM CHLORIDE 0.9 % (FLUSH) 0.9 %
5-40 SYRINGE (ML) INJECTION EVERY 12 HOURS SCHEDULED
Status: DISCONTINUED | OUTPATIENT
Start: 2024-06-27 | End: 2024-06-28 | Stop reason: HOSPADM

## 2024-06-27 RX ORDER — EPHEDRINE SULFATE/0.9% NACL/PF 50 MG/5 ML
SYRINGE (ML) INTRAVENOUS PRN
Status: DISCONTINUED | OUTPATIENT
Start: 2024-06-27 | End: 2024-06-27 | Stop reason: SDUPTHER

## 2024-06-27 RX ORDER — SODIUM CHLORIDE, SODIUM LACTATE, POTASSIUM CHLORIDE, CALCIUM CHLORIDE 600; 310; 30; 20 MG/100ML; MG/100ML; MG/100ML; MG/100ML
INJECTION, SOLUTION INTRAVENOUS CONTINUOUS PRN
Status: DISCONTINUED | OUTPATIENT
Start: 2024-06-27 | End: 2024-06-27 | Stop reason: SDUPTHER

## 2024-06-27 RX ORDER — DIPHENHYDRAMINE HCL 25 MG
25 CAPSULE ORAL EVERY 6 HOURS PRN
Status: DISCONTINUED | OUTPATIENT
Start: 2024-06-27 | End: 2024-06-28 | Stop reason: HOSPADM

## 2024-06-27 RX ORDER — DEXAMETHASONE SODIUM PHOSPHATE 10 MG/ML
INJECTION INTRAMUSCULAR; INTRAVENOUS PRN
Status: DISCONTINUED | OUTPATIENT
Start: 2024-06-27 | End: 2024-06-27 | Stop reason: SDUPTHER

## 2024-06-27 RX ORDER — SODIUM CHLORIDE, SODIUM LACTATE, POTASSIUM CHLORIDE, CALCIUM CHLORIDE 600; 310; 30; 20 MG/100ML; MG/100ML; MG/100ML; MG/100ML
INJECTION, SOLUTION INTRAVENOUS CONTINUOUS
Status: DISCONTINUED | OUTPATIENT
Start: 2024-06-27 | End: 2024-06-27 | Stop reason: HOSPADM

## 2024-06-27 RX ORDER — SODIUM CHLORIDE AND POTASSIUM CHLORIDE 150; 900 MG/100ML; MG/100ML
INJECTION, SOLUTION INTRAVENOUS CONTINUOUS
Status: DISCONTINUED | OUTPATIENT
Start: 2024-06-27 | End: 2024-06-28 | Stop reason: HOSPADM

## 2024-06-27 RX ORDER — METOCLOPRAMIDE HYDROCHLORIDE 5 MG/ML
10 INJECTION INTRAMUSCULAR; INTRAVENOUS ONCE
Status: COMPLETED | OUTPATIENT
Start: 2024-06-27 | End: 2024-06-27

## 2024-06-27 RX ORDER — BUPIVACAINE HYDROCHLORIDE 5 MG/ML
INJECTION, SOLUTION EPIDURAL; INTRACAUDAL PRN
Status: DISCONTINUED | OUTPATIENT
Start: 2024-06-27 | End: 2024-06-27 | Stop reason: HOSPADM

## 2024-06-27 RX ORDER — HEPARIN SODIUM 5000 [USP'U]/ML
5000 INJECTION, SOLUTION INTRAVENOUS; SUBCUTANEOUS ONCE
Status: COMPLETED | OUTPATIENT
Start: 2024-06-27 | End: 2024-06-27

## 2024-06-27 RX ORDER — PROCHLORPERAZINE EDISYLATE 5 MG/ML
5 INJECTION INTRAMUSCULAR; INTRAVENOUS
Status: COMPLETED | OUTPATIENT
Start: 2024-06-27 | End: 2024-06-27

## 2024-06-27 RX ORDER — ACETAMINOPHEN 325 MG/1
650 TABLET ORAL EVERY 6 HOURS
Status: DISCONTINUED | OUTPATIENT
Start: 2024-06-27 | End: 2024-06-28 | Stop reason: HOSPADM

## 2024-06-27 RX ORDER — LIDOCAINE HYDROCHLORIDE 10 MG/ML
1 INJECTION, SOLUTION INFILTRATION; PERINEURAL
Status: COMPLETED | OUTPATIENT
Start: 2024-06-27 | End: 2024-06-27

## 2024-06-27 RX ORDER — GLYCOPYRROLATE 0.2 MG/ML
INJECTION INTRAMUSCULAR; INTRAVENOUS PRN
Status: DISCONTINUED | OUTPATIENT
Start: 2024-06-27 | End: 2024-06-27 | Stop reason: SDUPTHER

## 2024-06-27 RX ORDER — ONDANSETRON 2 MG/ML
INJECTION INTRAMUSCULAR; INTRAVENOUS PRN
Status: DISCONTINUED | OUTPATIENT
Start: 2024-06-27 | End: 2024-06-27 | Stop reason: SDUPTHER

## 2024-06-27 RX ORDER — DIPHENHYDRAMINE HYDROCHLORIDE 50 MG/ML
25 INJECTION INTRAMUSCULAR; INTRAVENOUS EVERY 6 HOURS PRN
Status: DISCONTINUED | OUTPATIENT
Start: 2024-06-27 | End: 2024-06-28 | Stop reason: HOSPADM

## 2024-06-27 RX ORDER — SIMETHICONE 80 MG
80 TABLET,CHEWABLE ORAL EVERY 6 HOURS PRN
Status: DISCONTINUED | OUTPATIENT
Start: 2024-06-27 | End: 2024-06-28 | Stop reason: HOSPADM

## 2024-06-27 RX ORDER — ACETAMINOPHEN 500 MG
1000 TABLET ORAL ONCE
Status: COMPLETED | OUTPATIENT
Start: 2024-06-27 | End: 2024-06-27

## 2024-06-27 RX ADMIN — LIDOCAINE HYDROCHLORIDE 1 ML: 10 INJECTION, SOLUTION INFILTRATION; PERINEURAL at 07:00

## 2024-06-27 RX ADMIN — SUCRALFATE 1 G: 1 TABLET ORAL at 17:21

## 2024-06-27 RX ADMIN — GABAPENTIN 300 MG: 300 CAPSULE ORAL at 11:14

## 2024-06-27 RX ADMIN — ONDANSETRON 4 MG: 2 INJECTION INTRAMUSCULAR; INTRAVENOUS at 08:38

## 2024-06-27 RX ADMIN — KETOROLAC TROMETHAMINE 30 MG: 30 INJECTION, SOLUTION INTRAMUSCULAR at 21:08

## 2024-06-27 RX ADMIN — Medication 2000 MG: at 07:37

## 2024-06-27 RX ADMIN — SODIUM CHLORIDE, PRESERVATIVE FREE 10 ML: 5 INJECTION INTRAVENOUS at 21:20

## 2024-06-27 RX ADMIN — KETAMINE HYDROCHLORIDE 10 MG: 50 INJECTION, SOLUTION INTRAMUSCULAR; INTRAVENOUS at 07:45

## 2024-06-27 RX ADMIN — ACETAMINOPHEN 1000 MG: 500 TABLET, FILM COATED ORAL at 07:02

## 2024-06-27 RX ADMIN — GLYCOPYRROLATE 0.6 MG: 0.2 INJECTION INTRAMUSCULAR; INTRAVENOUS at 08:38

## 2024-06-27 RX ADMIN — MIDAZOLAM 2 MG: 1 INJECTION INTRAMUSCULAR; INTRAVENOUS at 07:12

## 2024-06-27 RX ADMIN — KETOROLAC TROMETHAMINE 30 MG: 30 INJECTION, SOLUTION INTRAMUSCULAR at 17:21

## 2024-06-27 RX ADMIN — GABAPENTIN 300 MG: 300 CAPSULE ORAL at 21:08

## 2024-06-27 RX ADMIN — ONDANSETRON 4 MG: 2 INJECTION INTRAMUSCULAR; INTRAVENOUS at 09:09

## 2024-06-27 RX ADMIN — ACETAMINOPHEN 650 MG: 325 TABLET, FILM COATED ORAL at 21:08

## 2024-06-27 RX ADMIN — KETAMINE HYDROCHLORIDE 20 MG: 50 INJECTION, SOLUTION INTRAMUSCULAR; INTRAVENOUS at 07:23

## 2024-06-27 RX ADMIN — Medication 10 MG: at 07:53

## 2024-06-27 RX ADMIN — KETOROLAC TROMETHAMINE 30 MG: 30 INJECTION, SOLUTION INTRAMUSCULAR at 11:14

## 2024-06-27 RX ADMIN — HEPARIN SODIUM 5000 UNITS: 5000 INJECTION INTRAVENOUS; SUBCUTANEOUS at 07:03

## 2024-06-27 RX ADMIN — DEXAMETHASONE SODIUM PHOSPHATE 10 MG: 10 INJECTION INTRAMUSCULAR; INTRAVENOUS at 07:37

## 2024-06-27 RX ADMIN — ACETAMINOPHEN 650 MG: 325 TABLET, FILM COATED ORAL at 17:21

## 2024-06-27 RX ADMIN — ROCURONIUM BROMIDE 40 MG: 10 INJECTION, SOLUTION INTRAVENOUS at 07:23

## 2024-06-27 RX ADMIN — OXYCODONE 5 MG: 5 TABLET ORAL at 23:22

## 2024-06-27 RX ADMIN — PANTOPRAZOLE SODIUM 40 MG: 40 INJECTION, POWDER, FOR SOLUTION INTRAVENOUS at 11:14

## 2024-06-27 RX ADMIN — LIDOCAINE HYDROCHLORIDE 60 MG: 20 INJECTION, SOLUTION EPIDURAL; INFILTRATION; INTRACAUDAL; PERINEURAL at 07:23

## 2024-06-27 RX ADMIN — SUCRALFATE 1 G: 1 TABLET ORAL at 23:22

## 2024-06-27 RX ADMIN — ACETAMINOPHEN 650 MG: 325 TABLET, FILM COATED ORAL at 11:13

## 2024-06-27 RX ADMIN — HYDROMORPHONE HYDROCHLORIDE 0.5 MG: 1 INJECTION, SOLUTION INTRAMUSCULAR; INTRAVENOUS; SUBCUTANEOUS at 09:26

## 2024-06-27 RX ADMIN — SUCRALFATE 1 G: 1 TABLET ORAL at 13:02

## 2024-06-27 RX ADMIN — SODIUM CHLORIDE, PRESERVATIVE FREE 10 ML: 5 INJECTION INTRAVENOUS at 10:45

## 2024-06-27 RX ADMIN — LIDOCAINE HYDROCHLORIDE 1.5 MG/KG/HR: 4 INJECTION, SOLUTION INTRAVENOUS at 07:37

## 2024-06-27 RX ADMIN — POTASSIUM CHLORIDE AND SODIUM CHLORIDE: 900; 150 INJECTION, SOLUTION INTRAVENOUS at 21:16

## 2024-06-27 RX ADMIN — SODIUM CHLORIDE, SODIUM LACTATE, POTASSIUM CHLORIDE, AND CALCIUM CHLORIDE: 600; 310; 30; 20 INJECTION, SOLUTION INTRAVENOUS at 07:01

## 2024-06-27 RX ADMIN — METOCLOPRAMIDE 10 MG: 5 INJECTION, SOLUTION INTRAMUSCULAR; INTRAVENOUS at 07:04

## 2024-06-27 RX ADMIN — PROCHLORPERAZINE EDISYLATE 5 MG: 5 INJECTION INTRAMUSCULAR; INTRAVENOUS at 09:26

## 2024-06-27 RX ADMIN — FENTANYL CITRATE 100 MCG: 50 INJECTION, SOLUTION INTRAMUSCULAR; INTRAVENOUS at 07:18

## 2024-06-27 RX ADMIN — GABAPENTIN 300 MG: 300 CAPSULE ORAL at 14:56

## 2024-06-27 RX ADMIN — HEPARIN SODIUM 5000 UNITS: 5000 INJECTION INTRAVENOUS; SUBCUTANEOUS at 14:57

## 2024-06-27 RX ADMIN — HEPARIN SODIUM 5000 UNITS: 5000 INJECTION INTRAVENOUS; SUBCUTANEOUS at 21:08

## 2024-06-27 RX ADMIN — HYDROMORPHONE HYDROCHLORIDE 1 MG: 2 INJECTION INTRAMUSCULAR; INTRAVENOUS; SUBCUTANEOUS at 08:42

## 2024-06-27 RX ADMIN — PROPOFOL 200 MG: 10 INJECTION, EMULSION INTRAVENOUS at 07:23

## 2024-06-27 RX ADMIN — ROCURONIUM BROMIDE 10 MG: 10 INJECTION, SOLUTION INTRAVENOUS at 07:40

## 2024-06-27 RX ADMIN — LIDOCAINE HYDROCHLORIDE 1 MG/KG/HR: 4 INJECTION, SOLUTION INTRAVENOUS at 09:22

## 2024-06-27 RX ADMIN — SODIUM CHLORIDE, SODIUM LACTATE, POTASSIUM CHLORIDE, AND CALCIUM CHLORIDE: 600; 310; 30; 20 INJECTION, SOLUTION INTRAVENOUS at 07:34

## 2024-06-27 RX ADMIN — HYDROMORPHONE HYDROCHLORIDE 0.5 MG: 1 INJECTION, SOLUTION INTRAMUSCULAR; INTRAVENOUS; SUBCUTANEOUS at 09:09

## 2024-06-27 RX ADMIN — SIMETHICONE 80 MG: 80 TABLET, CHEWABLE ORAL at 17:21

## 2024-06-27 RX ADMIN — SODIUM CHLORIDE 150 MG: 9 INJECTION, SOLUTION INTRAVENOUS at 07:01

## 2024-06-27 RX ADMIN — SODIUM CHLORIDE, SODIUM LACTATE, POTASSIUM CHLORIDE, AND CALCIUM CHLORIDE: 600; 310; 30; 20 INJECTION, SOLUTION INTRAVENOUS at 07:49

## 2024-06-27 RX ADMIN — FOLIC ACID: 5 INJECTION, SOLUTION INTRAMUSCULAR; INTRAVENOUS; SUBCUTANEOUS at 11:09

## 2024-06-27 RX ADMIN — Medication 5 MG: at 08:38

## 2024-06-27 ASSESSMENT — PAIN DESCRIPTION - DESCRIPTORS
DESCRIPTORS: DISCOMFORT
DESCRIPTORS: CRAMPING
DESCRIPTORS: CRAMPING

## 2024-06-27 ASSESSMENT — PAIN SCALES - GENERAL
PAINLEVEL_OUTOF10: 4
PAINLEVEL_OUTOF10: 7
PAINLEVEL_OUTOF10: 3
PAINLEVEL_OUTOF10: 2
PAINLEVEL_OUTOF10: 4
PAINLEVEL_OUTOF10: 8
PAINLEVEL_OUTOF10: 4
PAINLEVEL_OUTOF10: 3

## 2024-06-27 ASSESSMENT — PAIN - FUNCTIONAL ASSESSMENT
PAIN_FUNCTIONAL_ASSESSMENT: 0-10
PAIN_FUNCTIONAL_ASSESSMENT: ACTIVITIES ARE NOT PREVENTED
PAIN_FUNCTIONAL_ASSESSMENT: ACTIVITIES ARE NOT PREVENTED

## 2024-06-27 ASSESSMENT — PAIN DESCRIPTION - LOCATION
LOCATION: ABDOMEN

## 2024-06-27 ASSESSMENT — PAIN DESCRIPTION - PAIN TYPE: TYPE: ACUTE PAIN

## 2024-06-27 NOTE — ACP (ADVANCE CARE PLANNING)
Advance Care Planning     General Advance Care Planning (ACP) Conversation    Date of Conversation: 5/30/2024  Conducted with: Patient with Decision Making Capacity  Other persons present: None    Healthcare Decision Maker:  No healthcare decision makers have been documented.  Click here to complete HealthCare Decision Makers including selection of the Healthcare Decision Maker Relationship (ie \"Primary\")  Today we documented Decision Maker(s) consistent with Legal Next of Kin hierarchy.    Content/Action Overview:  DECLINED ACP Conversation - will revisit periodically    Length of Voluntary ACP Conversation in minutes:  <16 minutes (Non-Billable)    James Estrada RN

## 2024-06-27 NOTE — ANESTHESIA POSTPROCEDURE EVALUATION
Department of Anesthesiology  Postprocedure Note    Patient: Arcelia Brady  MRN: [id number]  YOB: 1980  Date of evaluation: 6/27/2024    Procedure Summary       Date: 06/27/24 Room / Location: [facility] MAIN OR 07 / [facility] MAIN OR    Anesthesia Start: 0711 Anesthesia Stop: 0858    Procedures:       ERAS/ GASTRECTOMY SLEEVE LAPAROSCOPIC ROBOTIC (Abdomen)      ESOPHAGOGASTRODUODENOSCOPY (Esophagus) Diagnosis:       Morbid obesity (HCC)      (Morbid obesity (HCC) [E66.01])    Surgeons: Radha Sharp MD Responsible Provider: XI Sanches MD    Anesthesia Type: general ASA Status: 3            Anesthesia Type: No value filed.    Hussain Phase I: Hussain Score: 8    Hussain Phase II:      Anesthesia Post Evaluation    Patient location during evaluation: PACU  Patient participation: complete - patient participated  Level of consciousness: awake and alert  Airway patency: patent  Nausea & Vomiting: no nausea and no vomiting  Cardiovascular status: hemodynamically stable  Respiratory status: acceptable  Hydration status: euvolemic  Comments: Blood pressure 136/82, pulse 59, temperature 97.1 °F (36.2 °C), temperature source Infrared, resp. rate 16, height 1.626 m (5' 4.02\"), weight 111 kg (244 lb 12.8 oz), last menstrual period 05/12/2024, SpO2 98 %.    No apparent anesthetic complications.  Pt stable for discharge from PACU  Multimodal analgesia pain management approach  Pain management: adequate    No notable events documented.

## 2024-06-27 NOTE — PERIOP NOTE
MD Sanches at bedside with patient. Pt VSS stable. Pain and Nausea controlled at this time. Verbal sign out per MD when pacu care is completed. Plan of care continues.

## 2024-06-27 NOTE — PLAN OF CARE
Problem: Pain  Goal: Verbalizes/displays adequate comfort level or baseline comfort level  6/27/2024 1754 by Jovon Schmidt, RN  Outcome: Progressing  6/27/2024 1753 by Jovon Schmidt RN  Outcome: Progressing     Problem: Discharge Planning  Goal: Discharge to home or other facility with appropriate resources  6/27/2024 1754 by Jovon Schmidt, RN  Outcome: Progressing  6/27/2024 1753 by Jovon Schmidt RN  Outcome: Progressing     Problem: ABCDS Injury Assessment  Goal: Absence of physical injury  6/27/2024 1754 by oJvon Schmidt, RN  Outcome: Progressing  6/27/2024 1753 by Jovon Schmidt RN  Outcome: Progressing

## 2024-06-27 NOTE — DISCHARGE INSTRUCTIONS
General Instructions  No bathtub or pool for 2 weeks. Ok to shower.  No weight lifting greater than 20 lbs for 6 weeks.  Leave the skin glue in place. It will fall off on its own in 7-10 days.    Diet  Take 30 cc (1 oz) of water or protein every 30 minutes.   You should be drinking about 64 ounces of fluids a day to prevent dehydration.  You should be consuming about 60-80 grams of protein a day to allow for good wound healing and healthy weight loss.     Medications  Take tylenol as needed for mild pain every 4-6 hours.   Percocet prescribed for mod to severe pain. This is a narcotic and can cause drowsiness, nausea and vomiting and constipation.  Take Colace 100mg BID (over the counter) if constipated.    Take Protonix 40 mg daily for 3 months.  Take Carafate 10 mL by mouth three (3) times daily for 30 days.  Take Zofran 8 mg tablet as needed for nausea every 8 hours.     Follow Up  Follow in bariatric clinic with Radha Mcclelland MD in 10 days. Your appointment should be already scheduled but if not, please call and make an appointment.

## 2024-06-27 NOTE — OP NOTE
Operative Note      Patient: Arcelia Brady  YOB: 1980  MRN: [id number]    Date of Procedure: 6/27/2024    Pre-Op Diagnosis: Morbid obesity (HCC) [E66.01]    Post-Op Diagnosis: Same       Procedure(s):  ERAS/ GASTRECTOMY SLEEVE LAPAROSCOPIC ROBOTIC  ESOPHAGOGASTRODUODENOSCOPY    Surgeon(s):  Radha Sharp MD    Assistant:   First Assistant: Julia Green    Anesthesia: General    Estimated Blood Loss (mL): Minimal    Complications: None    Specimens:   ID Type Source Tests Collected by Time Destination   A : portion of stomach Tissue Stomach SURGICAL PATHOLOGY Radha Sharp MD 6/27/2024 0820        Implants:  * No implants in log *      Drains: * No LDAs found *    Findings: tiny sliding hiatal hernia. Sleeve gastrectomy tacked to the omentum to prevent it sliding up. Normal appearing intra-abdominal anatomy. Negative leak test. Normal appearing gastric and esophageal mucousa without staple line bleeding.    Statement of Medical Necessity: Arcelia Brady is a 43 y.o. female who presented to the clinic with history of morbid obesity and Body mass index is 42 kg/m².. She failed multiple weight loss attempts meets the NIH criteria for obesity surgery. She decided for a laparoscopic vs open sleeve gastrectomy. We discussed specific risks of sleeve gastrectomy including but not limited to: pain, infection, bleeding, scar, excess skin, conversion to open approach, hernia, injury to adjacent organs, need for blood transfusion, thromboembolic complications, gastrointestinal leak, stricture, difficulty swallowing, need for revisional surgery, gastroesophageal reflux, esophagitis or esophageal cancer, need for revisional surgery, failure to lose weight or weight regain, nutritional deficiencies, heart attack, stroke, death. Patient understood and agreed to proceed.     Procedure Details   After informed consent was taken, patient was taken to the operating room

## 2024-06-27 NOTE — PERIOP NOTE
Nichelle Telles cousin [phone] in waiting room     Pt arrived in Pre-Op at 0559 and wasn't available for me to start prepping her for surgery until 0630. At that point she still had pants on and was texting and talking regarding a set of lost house keys.  I asked if she was OK to proceed with surgery, she replied that she was, so I asked her to let me get her ready. Family members arrived just before she left the room and took possession of her belongings.

## 2024-06-27 NOTE — CARE COORDINATION
CASE MANAGEMENT ASSESSMENT NOTE    Patient is a 43 year old female post gastrectomy    Patient assessment completed at bedside.  Patient presents to assessment alert and oriented, and answers questions appropriately.  She lives at home with children and grandmother.  At baseline, she is independent with transfers.  She is an active .  She works full time.  PCP is Dr. Loyd Wood.  She is insured through medicaid.  She does not use assistive devices or wear oxygen at home.  At this time, patient does not anticipate any additional needs when discharged home.    At this time, anticipate patient to be discharged home with no additional needs.  PT eval has been ordered and awaiting recommendations.  Case management will continue to follow.  Please notify if there are any changes.       Attending Physician: Mckenzie Sharp*  Admit Problem: Morbid obesity (HCC) [E66.01]  Date/Time of Admission: 6/27/2024  5:52 AM  Problem List:  Patient Active Problem List   Diagnosis    Slow transit constipation    Obesity, morbid (HCC)    Unrefreshed by sleep    URI, acute    Iron deficiency anemia due to chronic blood loss    Seasonal allergic rhinitis    Morbid obesity (HCC)          06/27/24 8719   Service Assessment   Patient Orientation Alert and Oriented   Cognition Alert   History Provided By Patient   Primary Caregiver Self   Accompanied By/Relationship Multiple family members.   Support Systems Children;Other (Comment)  (Other family members: cousin, grandmother, etc.)   Patient's Healthcare Decision Maker is: Legal Next of Kin   PCP Verified by CM Yes  (Dr. Loyd Wood)   Last Visit to PCP Within last year   Prior Functional Level Independent in ADLs/IADLs   Current Functional Level Independent in ADLs/IADLs   Can patient return to prior living arrangement Yes   Ability to make needs known: Good   Family able to assist with home care needs: Yes   Would you like for me to discuss the discharge plan with any

## 2024-06-27 NOTE — PROGRESS NOTES
Spiritual care and pre-op prayer given to patient.    Fatimah Lima M.Div, [facility] / / Bereavement Coordinator  Spiritual Care Department   c: [phone]/ [phone] / [email or web address]     [address] [address]  [email or web address]

## 2024-06-27 NOTE — ANESTHESIA PRE PROCEDURE
Mallampati: II  TM distance: <3 FB   Neck ROM: full     Dental: normal exam         Pulmonary:Negative Pulmonary ROS and normal exam  breath sounds clear to auscultation                             Cardiovascular:Negative CV ROS  Exercise tolerance: good (>4 METS)          Rhythm: regular  Rate: normal                    Neuro/Psych:   Negative Neuro/Psych ROS              GI/Hepatic/Renal:   (+) morbid obesity          Endo/Other:    (+) blood dyscrasia (FORTUNATO - Hgb 10): anemia:..                 Abdominal:              PE comment: Deferred   Vascular: negative vascular ROS.         Other Findings:       Anesthesia Plan      general     ASA 3       Induction: intravenous.    MIPS: Postoperative opioids intended and Prophylactic antiemetics administered.  Anesthetic plan and risks discussed with patient.                    Benedicto Gardner MD   6/27/2024

## 2024-06-27 NOTE — INTERVAL H&P NOTE
Update History & Physical    The patient's History and Physical of May 30, robotic sleeve gastrectomy was reviewed with the patient and I examined the patient. There was no change. The surgical site was confirmed by the patient and me.     Plan: The risks, benefits, expected outcome, and alternative to the recommended procedure have been discussed with the patient. Patient understands and wants to proceed with the procedure.     Electronically signed by Radha Mcclelland MD on 6/27/2024 at 6:47 AM

## 2024-06-27 NOTE — PROGRESS NOTES
ACUTE PHYSICAL THERAPY GOALS:   (Developed with and agreed upon by patient and/or caregiver.)  STG:  (1.)Ms. Brady will move from supine to sit and sit to supine  with INDEPENDENT within 7 treatment day(s).    (2.)Ms. Brady will transfer from bed to chair and chair to bed with SUPERVISION using the least restrictive device within 7 treatment day(s).    (3.)Ms. Brady will ambulate with SUPERVISION for 650 feet with the least restrictive device within 7 treatment day(s).   (4)Ms. Brady will go up 3 steps with rail CGA in 7 days.  (5)Ms. Brady will be independent with HEP in 7 days.  ________________________________________________________________________________________________      PHYSICAL THERAPY Initial Assessment and PM  (Link to Caseload Tracking: PT Visit Days : 1  Acknowledge Orders  Time In/Out  PT Charge Capture  Rehab Caseload Tracker    Arcelia Brady is a 43 y.o. female   PRIMARY DIAGNOSIS: Morbid obesity (HCC)  Morbid obesity (HCC) [E66.01]  Procedure(s) (LRB):  ERAS/ GASTRECTOMY SLEEVE LAPAROSCOPIC ROBOTIC (N/A)  ESOPHAGOGASTRODUODENOSCOPY (N/A)  Day of Surgery  Reason for Referral: Generalized Muscle Weakness (M62.81)  Other abnormalities of gait and mobility (R26.89)  Observation: Payor: [employer] [place] MEDICAID / Plan: [employer] [place] MEDICA[place] / Product Type: *No Product type* /     ASSESSMENT:     REHAB RECOMMENDATIONS:   Recommendation to date pending progress:  Setting:  No further skilled physical therapy after discharge from hospital    Equipment:    None  To Be Determined     ASSESSMENT:  Ms. Brady presents with decreased functional mobility and gait s/p gastrectomy sleeve.  She is normally is independent with adl's and gait. Her daughter lives with her.  This afternoon, she is drowsy but agreeable.  She transferred CGA to edge of bed.  She became more alert once sitting.  She did exercises below.  She stood up min assist and needed hand held assist with one  hand and pushed IV pole with the other to ambulate in the hurt.  She returned to bedside recliner.  One liter O2 donned back on.  Her daughters came at this point and were going to stay with her.  Will follow as she would benefit from continued PT while she is here.  Encouraged her to ambulate with staff over the course of the afternoon and evening.     [facility] AM-PAC™ “6 Clicks” Basic Mobility Inpatient Short Form  AM-PAC Basic Mobility - Inpatient   How much help is needed turning from your back to your side while in a flat bed without using bedrails?: A Little  How much help is needed moving from lying on your back to sitting on the side of a flat bed without using bedrails?: A Little  How much help is needed moving to and from a bed to a chair?: A Little  How much help is needed standing up from a chair using your arms?: A Little  How much help is needed walking in hospital room?: A Little  How much help is needed climbing 3-5 steps with a railing?: A Little  AM-[facility] Inpatient Mobility Raw Score : 18  AM-PAC Inpatient T-Scale Score : 43.63  Mobility Inpatient CMS 0-100% Score: 46.58  Mobility Inpatient CMS G-Code Modifier : CK    SUBJECTIVE:   Ms. Brady states, \"ok\"     Social/Functional Lives With: Daughter  Type of Home: House    OBJECTIVE:     PAIN: VITALS / O2: PRECAUTION / LINES / DRAINS:   Pre Treatment:    0      Post Treatment: 0 Vitals        Oxygen      IV    RESTRICTIONS/PRECAUTIONS:  Restrictions/Precautions: Surgical Protocols                 GROSS EVALUATION:  Intact Impaired (Comments):   AROM [x]     PROM []    Strength [x]     Balance [] Sitting - Static: Good  Sitting - Dynamic: Fair  Standing - Static: Fair  Standing - Dynamic: Fair   Posture [] Rounded Shoulders   Sensation [x]     Coordination []      Tone []     Edema []    Activity Tolerance [] Patient Tolerated treatment well    []      COGNITION/  PERCEPTION: Intact Impaired (Comments):   Orientation [x]  drowsy   Vision [x]

## 2024-06-27 NOTE — PERIOP NOTE
TRANSFER - OUT REPORT:    Verbal report given to Tenece RN on Arcelia Brady  being transferred to [facility] for routine post-op       Report consisted of patient's Situation, Background, Assessment and   Recommendations(SBAR).     Information from the following report(s) Nurse Handoff Report, Adult Overview, Surgery Report, MAR, and Cardiac Rhythm SB  was reviewed with the receiving nurse.           Lines:   Peripheral IV 06/27/24 Left;Posterior Hand (Active)   Site Assessment Clean, dry & intact 06/27/24 0950   Line Status Infusing;Flushed 06/27/24 0950   Line Care Connections checked and tightened 06/27/24 0950   Phlebitis Assessment No symptoms 06/27/24 0950   Infiltration Assessment 0 06/27/24 0950   Alcohol Cap Used No 06/27/24 0657   Dressing Status Clean, dry & intact 06/27/24 0950   Dressing Type Transparent 06/27/24 0950       Peripheral IV 06/27/24 Posterior;Right Hand (Active)   Site Assessment Clean, dry & intact 06/27/24 0950   Line Status Infusing 06/27/24 0950   Phlebitis Assessment No symptoms 06/27/24 0950   Infiltration Assessment 0 06/27/24 0950   Alcohol Cap Used No 06/27/24 0658   Dressing Status Clean, dry & intact 06/27/24 0950   Dressing Type Transparent 06/27/24 0950        Opportunity for questions and clarification was provided.      Patient transported with:  O2 @ 2lpm

## 2024-06-28 VITALS
HEIGHT: 64 IN | RESPIRATION RATE: 18 BRPM | SYSTOLIC BLOOD PRESSURE: 126 MMHG | WEIGHT: 244.8 LBS | HEART RATE: 102 BPM | TEMPERATURE: 98.2 F | OXYGEN SATURATION: 96 % | DIASTOLIC BLOOD PRESSURE: 71 MMHG | BODY MASS INDEX: 41.79 KG/M2

## 2024-06-28 LAB
ANION GAP SERPL CALC-SCNC: 12 MMOL/L (ref 9–18)
BASOPHILS # BLD: 0 K/UL (ref 0–0.2)
BASOPHILS NFR BLD: 0 % (ref 0–2)
BUN SERPL-MCNC: 9 MG/DL (ref 6–23)
CALCIUM SERPL-MCNC: 8.6 MG/DL (ref 8.8–10.2)
CHLORIDE SERPL-SCNC: 108 MMOL/L (ref 98–107)
CO2 SERPL-SCNC: 22 MMOL/L (ref 20–28)
CREAT SERPL-MCNC: 0.62 MG/DL (ref 0.6–1.1)
DIFFERENTIAL METHOD BLD: ABNORMAL
EOSINOPHIL # BLD: 0 K/UL (ref 0–0.8)
EOSINOPHIL NFR BLD: 0 % (ref 0.5–7.8)
ERYTHROCYTE [DISTWIDTH] IN BLOOD BY AUTOMATED COUNT: 14.8 % (ref 11.9–14.6)
GLUCOSE SERPL-MCNC: 128 MG/DL (ref 70–99)
HCT VFR BLD AUTO: 36.7 % (ref 35.8–46.3)
HGB BLD-MCNC: 11 G/DL (ref 11.7–15.4)
IMM GRANULOCYTES # BLD AUTO: 0.1 K/UL (ref 0–0.5)
IMM GRANULOCYTES NFR BLD AUTO: 1 % (ref 0–5)
LYMPHOCYTES # BLD: 0.8 K/UL (ref 0.5–4.6)
LYMPHOCYTES NFR BLD: 6 % (ref 13–44)
MCH RBC QN AUTO: 24.7 PG (ref 26.1–32.9)
MCHC RBC AUTO-ENTMCNC: 30 G/DL (ref 31.4–35)
MCV RBC AUTO: 82.3 FL (ref 82–102)
MONOCYTES # BLD: 0.7 K/UL (ref 0.1–1.3)
MONOCYTES NFR BLD: 5 % (ref 4–12)
NEUTS SEG # BLD: 11.8 K/UL (ref 1.7–8.2)
NEUTS SEG NFR BLD: 88 % (ref 43–78)
NRBC # BLD: 0 K/UL (ref 0–0.2)
PLATELET # BLD AUTO: 359 K/UL (ref 150–450)
PMV BLD AUTO: 9 FL (ref 9.4–12.3)
POTASSIUM SERPL-SCNC: 4.5 MMOL/L (ref 3.5–5.1)
RBC # BLD AUTO: 4.46 M/UL (ref 4.05–5.2)
SODIUM SERPL-SCNC: 142 MMOL/L (ref 136–145)
WBC # BLD AUTO: 13.3 K/UL (ref 4.3–11.1)

## 2024-06-28 PROCEDURE — C9113 INJ PANTOPRAZOLE SODIUM, VIA: HCPCS | Performed by: PHYSICIAN ASSISTANT

## 2024-06-28 PROCEDURE — 96366 THER/PROPH/DIAG IV INF ADDON: CPT

## 2024-06-28 PROCEDURE — 6370000000 HC RX 637 (ALT 250 FOR IP): Performed by: PHYSICIAN ASSISTANT

## 2024-06-28 PROCEDURE — 96372 THER/PROPH/DIAG INJ SC/IM: CPT

## 2024-06-28 PROCEDURE — 96365 THER/PROPH/DIAG IV INF INIT: CPT

## 2024-06-28 PROCEDURE — G0378 HOSPITAL OBSERVATION PER HR: HCPCS

## 2024-06-28 PROCEDURE — 6360000002 HC RX W HCPCS: Performed by: PHYSICIAN ASSISTANT

## 2024-06-28 PROCEDURE — 36415 COLL VENOUS BLD VENIPUNCTURE: CPT

## 2024-06-28 PROCEDURE — 2580000003 HC RX 258: Performed by: PHYSICIAN ASSISTANT

## 2024-06-28 PROCEDURE — 85025 COMPLETE CBC W/AUTO DIFF WBC: CPT

## 2024-06-28 PROCEDURE — 97530 THERAPEUTIC ACTIVITIES: CPT

## 2024-06-28 PROCEDURE — 80048 BASIC METABOLIC PNL TOTAL CA: CPT

## 2024-06-28 PROCEDURE — 96375 TX/PRO/DX INJ NEW DRUG ADDON: CPT

## 2024-06-28 RX ADMIN — PANTOPRAZOLE SODIUM 40 MG: 40 INJECTION, POWDER, FOR SOLUTION INTRAVENOUS at 09:08

## 2024-06-28 RX ADMIN — GABAPENTIN 300 MG: 300 CAPSULE ORAL at 09:07

## 2024-06-28 RX ADMIN — POTASSIUM CHLORIDE AND SODIUM CHLORIDE: 900; 150 INJECTION, SOLUTION INTRAVENOUS at 06:07

## 2024-06-28 RX ADMIN — ACETAMINOPHEN 650 MG: 325 TABLET, FILM COATED ORAL at 09:07

## 2024-06-28 RX ADMIN — SUCRALFATE 1 G: 1 TABLET ORAL at 06:06

## 2024-06-28 RX ADMIN — ACETAMINOPHEN 650 MG: 325 TABLET, FILM COATED ORAL at 04:19

## 2024-06-28 RX ADMIN — SIMETHICONE 80 MG: 80 TABLET, CHEWABLE ORAL at 09:07

## 2024-06-28 RX ADMIN — HEPARIN SODIUM 5000 UNITS: 5000 INJECTION INTRAVENOUS; SUBCUTANEOUS at 06:06

## 2024-06-28 ASSESSMENT — PAIN DESCRIPTION - DESCRIPTORS: DESCRIPTORS: DISCOMFORT;SORE

## 2024-06-28 ASSESSMENT — PAIN SCALES - GENERAL
PAINLEVEL_OUTOF10: 4
PAINLEVEL_OUTOF10: 3

## 2024-06-28 ASSESSMENT — PAIN DESCRIPTION - LOCATION: LOCATION: ABDOMEN

## 2024-06-28 NOTE — PROGRESS NOTES
ACUTE PHYSICAL THERAPY GOALS:   (Developed with and agreed upon by patient and/or caregiver.)  STG:  (1.)Ms. Brady will move from supine to sit and sit to supine  with INDEPENDENT within 7 treatment day(s).    (2.)Ms. Brady will transfer from bed to chair and chair to bed with SUPERVISION using the least restrictive device within 7 treatment day(s).    (3.)Ms. Brady will ambulate with SUPERVISION for 650 feet with the least restrictive device within 7 treatment day(s).   (4)Ms. Brady will go up 3 steps with rail CGA in 7 days.  (5)Ms. Brady will be independent with HEP in 7 days.  ________________________________________________________________________________________________      PHYSICAL THERAPY Daily Note and AM  (Link to Caseload Tracking: PT Visit Days : 1  Acknowledge Orders  Time In/Out  PT Charge Capture  Rehab Caseload Tracker    Arcelia Brady is a 43 y.o. female   PRIMARY DIAGNOSIS: Morbid obesity (HCC)  Morbid obesity (HCC) [E66.01]  Procedure(s) (LRB):  ERAS/ GASTRECTOMY SLEEVE LAPAROSCOPIC ROBOTIC (N/A)  ESOPHAGOGASTRODUODENOSCOPY (N/A)  1 Day Post-Op  Reason for Referral: Generalized Muscle Weakness (M62.81)  Other abnormalities of gait and mobility (R26.89)  Observation: Pay[place]: [employer] [place] MEDICA[place] / Plan: [employer] [place] MEDICA[place] / Product Type: *No Product type* /     ASSESSMENT:     REHAB RECOMMENDATIONS:   Recommendation to date pending progress:  Setting:  No further skilled physical therapy after discharge from hospital    Equipment:    None  To Be Determined     ASSESSMENT:  Ms. Brady presents with decreased functional mobility and gait s/p gastrectomy sleeve.  She is normally is independent with adl's and gait. Her daughter lives with her.  This afternoon, she is drowsy but agreeable.  She transferred CGA to edge of bed.  She became more alert once sitting.  She did exercises below.  She stood up min assist and needed hand held assist with one hand and  pushed IV pole with the other to ambulate in the hurt.  She returned to bedside recliner.  One liter O2 donned back on.  Her daughters came at this point and were going to stay with her.  Will follow as she would benefit from continued PT while she is here.  Encouraged her to ambulate with staff over the course of the afternoon and evening.    6/28 supine upon arrival.  Work on bed mobility as follows:supine>scooting>eob Independently.  Sat on eob few min.  Then ambulated 650 ft around the floor pushing the IV Pole with SBA.  Stop off by the steps and went up/down with B rails with SBA.  Finish walking to the room.  Return to the eob and performs B UE/LE exercises (see below) with good technique.  All question answer and ready for D/C.     [facility]™ “6 Clicks” Basic Mobility Inpatient Short Form  AM-PAC Basic Mobility - Inpatient   How much help is needed turning from your back to your side while in a flat bed without using bedrails?: A Little  How much help is needed moving from lying on your back to sitting on the side of a flat bed without using bedrails?: A Little  How much help is needed moving to and from a bed to a chair?: A Little  How much help is needed standing up from a chair using your arms?: A Little  How much help is needed walking in hospital room?: A Little  How much help is needed climbing 3-5 steps with a railing?: A Little  [facility] Inpatient Mobility Raw Score : 18  AM-PAC Inpatient T-Scale Score : 43.63  Mobility Inpatient CMS 0-100% Score: 46.58  Mobility Inpatient CMS G-Code Modifier : CK    SUBJECTIVE:   Ms. Brady agreeable    Social/Functional Lives With: Family  Type of Home: House  Bathroom Equipment: None  Home Equipment: None  Active : Yes  Mode of Transportation: Car  Occupation: Full time employment    OBJECTIVE:     PAIN: VITALS / O2: PRECAUTION / LINES / DRAINS:   Pre Treatment:    0      Post Treatment: 0 Vitals        Oxygen RA

## 2024-06-28 NOTE — CARE COORDINATION
Patient with discharge orders for today. No additional needs made known to CM. Patient has met all treatment goals and milestones for discharge. Family to provide transportation home. CM following until patient is discharged.        06/28/24 0961   Services At/After Discharge   Transition of Care Consult (CM Consult) N/A   Services At/After Discharge None    Resource Information Provided? No   Mode of Transport at Discharge Other (see comment)  (Family)   Confirm Follow Up Transport Family   Condition of Participation: Discharge Planning   The Plan for Transition of Care is related to the following treatment goals: Patient to reach baseline level of function   The Patient and/or Patient Representative was provided with a Choice of Provider? Patient   The Patient and/Or Patient Representative agree with the Discharge Plan? Yes   Freedom of Choice list was provided with basic dialogue that supports the patient's individualized plan of care/goals, treatment preferences, and shares the quality data associated with the providers?  Yes

## 2024-06-28 NOTE — PROGRESS NOTES
[facility] ERAS End of Shift Note      1 Day Post-Op    Voiding: Yes    Flatus: No    Tolerating Clear Liquids?: Yes    Tolerating Ensure Max?: Yes    Ambulated in hallway 1 times.    Up to chair for 0 meals.    Lidocaine: Yes    PRN Pain Medications Used?: No    IS Used: Yes    Ideal body weight: 54.7 kg (120 lb 10.8 oz)    Signed By: Lawanda Parson RN     June 28, 2024

## 2024-06-28 NOTE — OR NURSING
Pt has multiple bruises to body. Left upper arm bruise, oval shape. Approximately 1x2cm, blue-grey in color. Left chest on breast tissue from nipple line to sternum, round approximately 10cm diameter. Chest bruise is brown with yellowing edges. PACU RN verbally notified.

## 2024-06-28 NOTE — PROGRESS NOTES
[facility] ERAS End of Shift Note      1 Day Post-Op    Voiding: Yes    Flatus: No    Tolerating Clear Liquids?: Yes    Tolerating Ensure Max?: Yes    Ambulated in hallway 1 times.    Up to chair for 1 meals.    Lidocaine: Yes    PRN Pain Medications Used?: No    IS Used: Yes    Ideal body weight: 54.7 kg (120 lb 10.8 oz)    Signed By: Lawanda Parson RN     June 28, 2024

## 2024-06-28 NOTE — PLAN OF CARE
Problem: Pain  Goal: Verbalizes/displays adequate comfort level or baseline comfort level  6/28/2024 0501 by Lawanda Parson RN  Outcome: Progressing  6/27/2024 1754 by Jovon Schmidt RN  Outcome: Progressing  6/27/2024 1753 by Jovon Schmidt RN  Outcome: Progressing     Problem: Discharge Planning  Goal: Discharge to home or other facility with appropriate resources  6/28/2024 0501 by Lawanda Parson RN  Outcome: Progressing  6/27/2024 1754 by Jovon Schmidt RN  Outcome: Progressing  6/27/2024 1753 by Jovon Schmidt RN  Outcome: Progressing     Problem: ABCDS Injury Assessment  Goal: Absence of physical injury  6/28/2024 0501 by Lawanda Parson RN  Outcome: Progressing  6/27/2024 1754 by Jovon Schmidt RN  Outcome: Progressing  6/27/2024 1753 by Jovon Schmidt RN  Outcome: Progressing     Problem: Safety - Adult  Goal: Free from fall injury  Outcome: Progressing

## 2024-06-28 NOTE — PROGRESS NOTES
Radha Mcclelland MD   Bariatric & Advanced Laparoscopic Surgery & Endoscopy  [address], [address]  [place] [address]  Phone [phone]   Fax [phone]      Date of visit: 2024          Name: Arcelia Brady      MRN: [id number]       : 1980       Age: 43 y.o.    Sex: female          Subjective:     Arcelia Brady is a 43 y.o. female s/p Procedure(s):  ERAS/ GASTRECTOMY SLEEVE LAPAROSCOPIC ROBOTIC  ESOPHAGOGASTRODUODENOSCOPY 1 Day Post-Op    POD 1  Nausea/Vomiting - mild  HR overnight - Normal  Labs reviewed - normal    Doing well. Walked. Tolerating clears and protein. Mild nausea but now resolved. Some reflux after taking carafate pill.     MEDS:    Current Facility-Administered Medications   Medication Dose Route Frequency Provider Last Rate Last Admin    diphenhydrAMINE (BENADRYL) capsule 25 mg  25 mg Oral Q6H PRN Sherita Sherwood PA        Or    diphenhydrAMINE (BENADRYL) injection 25 mg  25 mg IntraVENous Q6H PRN Sherita Sherwood PA        0.9% NaCl with KCl 20 mEq infusion   IntraVENous Continuous Radha Sharp  mL/hr at 24 0607 New Bag at 24 0607    sodium chloride flush 0.9 % injection 5-40 mL  5-40 mL IntraVENous 2 times per day Sherita Sherwood PA   10 mL at 24 2120    sodium chloride flush 0.9 % injection 5-40 mL  5-40 mL IntraVENous PRN Sherita Sherwood PA        0.9 % sodium chloride infusion   IntraVENous PRN Sherita Sherwood PA        acetaminophen (TYLENOL) tablet 650 mg  650 mg Oral Q6H Sherita Sherwood PA   650 mg at 24 0419    oxyCODONE (ROXICODONE) immediate release tablet 5 mg  5 mg Oral Q4H PRN Sherita Sherwood PA   5 mg at 24 2322    HYDROmorphone HCl PF (DILAUDID) injection 0.5 mg  0.5 mg IntraVENous Q3H PRN Sherita Sherwood PA        ondansetron (ZOFRAN) injection 4 mg  4 mg IntraVENous Q6H PRN Sherita Sherwood PA

## 2024-06-28 NOTE — PLAN OF CARE
Problem: Pain  Goal: Verbalizes/displays adequate comfort level or baseline comfort level  6/28/2024 0812 by Lawanda Parson RN  Outcome: Progressing  6/28/2024 0501 by Lawanda Parson RN  Outcome: Progressing     Problem: Discharge Planning  Goal: Discharge to home or other facility with appropriate resources  6/28/2024 0812 by Lawanda Parson RN  Outcome: Progressing  6/28/2024 0501 by Lawanda Parson RN  Outcome: Progressing     Problem: ABCDS Injury Assessment  Goal: Absence of physical injury  6/28/2024 0812 by Lawanda Parson RN  Outcome: Progressing  6/28/2024 0501 by Lawanda Parson RN  Outcome: Progressing     Problem: Safety - Adult  Goal: Free from fall injury  6/28/2024 0812 by Lawanda Parson RN  Outcome: Progressing  6/28/2024 0501 by Lawanda Parson RN  Outcome: Progressing

## 2024-06-28 NOTE — PLAN OF CARE
Problem: ABCDS Injury Assessment  Goal: Absence of physical injury  6/28/2024 0815 by Lawanda Parson RN  Outcome: Progressing  6/28/2024 0812 by Lawanda Parson RN  Outcome: Progressing  6/28/2024 0501 by Lawanda Parson RN  Outcome: Progressing     Problem: Safety - Adult  Goal: Free from fall injury  6/28/2024 0815 by Lawanda Parson RN  Outcome: Progressing  6/28/2024 0812 by Lawanda Parson RN  Outcome: Progressing  6/28/2024 0501 by Lawanda Parson RN  Outcome: Progressing     Problem: Discharge Planning  Goal: Discharge to home or other facility with appropriate resources  6/28/2024 0815 by Lawanda Parson RN  Outcome: Progressing  6/28/2024 0812 by Lawanda Parson RN  Outcome: Progressing  6/28/2024 0501 by Lawanda Parson RN  Outcome: Progressing     Problem: Pain  Goal: Verbalizes/displays adequate comfort level or baseline comfort level  6/28/2024 0815 by Lawanda Parson RN  Outcome: Progressing  6/28/2024 0812 by Lawanda Parson RN  Outcome: Progressing  6/28/2024 0501 by Lawanda Parson RN  Outcome: Progressing

## 2024-07-05 DIAGNOSIS — B37.9 YEAST INFECTION: Primary | ICD-10-CM

## 2024-07-05 RX ORDER — FLUCONAZOLE 150 MG/1
150 TABLET ORAL
Qty: 2 TABLET | Refills: 0 | Status: SHIPPED | OUTPATIENT
Start: 2024-07-05 | End: 2024-07-11

## 2024-07-08 ENCOUNTER — OFFICE VISIT (OUTPATIENT)
Dept: SURGERY | Age: 44
End: 2024-07-08

## 2024-07-08 VITALS
WEIGHT: 226 LBS | HEART RATE: 107 BPM | HEIGHT: 64 IN | DIASTOLIC BLOOD PRESSURE: 75 MMHG | BODY MASS INDEX: 38.58 KG/M2 | SYSTOLIC BLOOD PRESSURE: 120 MMHG

## 2024-07-08 DIAGNOSIS — E66.01 MORBID OBESITY (HCC): Primary | ICD-10-CM

## 2024-07-08 DIAGNOSIS — K59.01 SLOW TRANSIT CONSTIPATION: ICD-10-CM

## 2024-07-08 DIAGNOSIS — R10.9 RIGHT SIDED ABDOMINAL PAIN: ICD-10-CM

## 2024-07-08 DIAGNOSIS — Z71.3 DIETARY COUNSELING: ICD-10-CM

## 2024-07-08 DIAGNOSIS — E66.9 OBESITY, CLASS II, BMI 35-39.9: ICD-10-CM

## 2024-07-08 DIAGNOSIS — Z71.82 EXERCISE COUNSELING: ICD-10-CM

## 2024-07-08 DIAGNOSIS — R11.2 NAUSEA AND VOMITING, UNSPECIFIED VOMITING TYPE: ICD-10-CM

## 2024-07-08 DIAGNOSIS — Z98.84 S/P LAPAROSCOPIC SLEEVE GASTRECTOMY: ICD-10-CM

## 2024-07-08 PROCEDURE — 99024 POSTOP FOLLOW-UP VISIT: CPT | Performed by: PHYSICIAN ASSISTANT

## 2024-07-08 NOTE — PROGRESS NOTES
been doing her protein shakes with some difficulty.     Protein:  60-80 grams per day  Fluids:    36-40 ounces per day  Exercise:  none    PMH:  Past Medical History:   Diagnosis Date    Anemia     oral iron    Fatigue     History of positive PPD 1998    treated with med    Lack of energy     Morbid obesity (HCC)     BMI-42.0    Musculoskeletal disorder     joint pain, back pain, leg pain    Slow transit constipation 12/13/2018    Snoring     Unrefreshed by sleep 3/15/2018    URI, acute     denies (noted 6/13/24)    Varicose veins of legs     Varicose veins of legs      Patient Active Problem List   Diagnosis    Slow transit constipation    Obesity, morbid (HCC)    Unrefreshed by sleep    URI, acute    Iron deficiency anemia due to chronic blood loss    Seasonal allergic rhinitis    Morbid obesity (HCC)        PSH:  Past Surgical History:   Procedure Laterality Date    LEEP      SLEEVE GASTRECTOMY N/A 6/27/2024    ERAS/ GASTRECTOMY SLEEVE LAPAROSCOPIC ROBOTIC performed by Radha Sharp MD at [facility] OR    TUBAL LIGATION  01/12/2017    UPPER GASTROINTESTINAL ENDOSCOPY N/A 6/27/2024    ESOPHAGOGASTRODUODENOSCOPY performed by Radha Sharp MD at [facility] OR    WISDOM TOOTH EXTRACTION  1999       MEDS:  Current Outpatient Medications   Medication Sig Dispense Refill    fluconazole (DIFLUCAN) 150 MG tablet Take 1 tablet by mouth every 72 hours for 6 days 2 tablet 0    Multiple Vitamin (MULTIVITAMIN ADULT PO) Take 1 tablet by mouth every other day (Patient not taking: Reported on 6/27/2024)      omeprazole (PRILOSEC) 40 MG delayed release capsule Take 1 capsule by mouth every morning (before breakfast) (Patient not taking: Reported on 6/13/2024) 90 capsule 0    ondansetron (ZOFRAN) 4 MG tablet Take 1 tablet by mouth 3 times daily as needed for Nausea or Vomiting (Patient not taking: Reported on 6/13/2024) 30 tablet 0    ferrous sulfate (IRON 325) 325 (65 Fe) MG tablet Take 1 tablet by mouth

## 2024-07-25 NOTE — PROGRESS NOTES
chart review and preparation), obtaining and/or reviewing additional medical history, performing a physical exam and evaluation, documenting clinical information in the EHR, independently interpreting results, communicating results to patient, family or caregiver, and/or coordinating care.

## 2024-07-29 ENCOUNTER — OFFICE VISIT (OUTPATIENT)
Dept: SURGERY | Age: 44
End: 2024-07-29

## 2024-07-29 VITALS
DIASTOLIC BLOOD PRESSURE: 76 MMHG | BODY MASS INDEX: 38.24 KG/M2 | WEIGHT: 224 LBS | HEART RATE: 89 BPM | HEIGHT: 64 IN | SYSTOLIC BLOOD PRESSURE: 117 MMHG

## 2024-07-29 DIAGNOSIS — E66.01 MORBID OBESITY (HCC): Primary | ICD-10-CM

## 2024-07-29 DIAGNOSIS — Z71.82 EXERCISE COUNSELING: ICD-10-CM

## 2024-07-29 DIAGNOSIS — Z98.84 S/P LAPAROSCOPIC SLEEVE GASTRECTOMY: ICD-10-CM

## 2024-07-29 DIAGNOSIS — Z71.3 DIETARY COUNSELING: ICD-10-CM

## 2024-07-29 DIAGNOSIS — K59.01 SLOW TRANSIT CONSTIPATION: ICD-10-CM

## 2024-07-29 DIAGNOSIS — E66.9 OBESITY, CLASS II, BMI 35-39.9: ICD-10-CM

## 2024-07-29 PROCEDURE — 99024 POSTOP FOLLOW-UP VISIT: CPT | Performed by: PHYSICIAN ASSISTANT

## 2024-08-08 NOTE — PROGRESS NOTES
Sherita Sherwood PA-C  Bariatric & Advanced Laparoscopic Surgery & Endoscopy      Date of visit: 2024          Name: Arcelia Brady      MRN: [id number]       : 1980       Sex: female  PCP: Loyd Wood MD     Surgeon: Dr. Radha Mcclelland  Procedure: robotic assisted sleeve gastrectomy     Surgeon: William   DOS: 2024   Procedure: Sleeve   Pre-op weight: 244   Ideal body weight: 141   Excess body weight: 103      Weight History Graph  Last Surgical Weight Loss:      7/3/2024     9:59 AM 2024     1:56 PM 2024     1:59 PM 2024     3:15 PM   Surgical Weight Loss Tracker   Consult Date 3/6/2024      Initial Height 5' 3\"      Initial Weight 243 lb      Initial BMI 43.04      Ideal Body Weight 141 lb      Surgery Date 2024      Pre-Surgical Height 5' 3\"      Pre-Surgical Weight 244 lb      Pre Surgery BMI 43.22      Weight to Lose 103 lb      Date  2024   Height  5' 4\" 5' 4\" 5' 4\"   Weight  226 lb 224 lb 218 lb   BMI  38.79 38.45 37.42   Weight Change  -18 lb -2 lb -6 lb   Total Weight Change  -18 lb -20 lb -26 lb   % EBWL  17% 19% 25%      Subjective   6 weeks post-op visit after a robotic assisted sleeve gastrectomy was done on 2024.   She has lost 6lbs since her last office visit.    Clinical Assessment:    She is doing very well today and is feeling good.  She reports that she has been adhering to the soft diet without difficulty.  She denies any abdominal pain, nausea or vomiting or heartburn.  She denies fevers or chills, or any redness or drainage from the incision sites.  She does report having intermittent problems with constipation for which she takes Miralax and one stool softener daily.  Her pain is well controlled and is not taking pain medications.  She has been taking the PPI without difficulty.  She has been doing her protein shakes without difficulty.     Protein:  60-80 grams per day  Fluids:    40 ounces per

## 2024-08-12 ENCOUNTER — OFFICE VISIT (OUTPATIENT)
Dept: SURGERY | Age: 44
End: 2024-08-12

## 2024-08-12 VITALS
HEART RATE: 80 BPM | BODY MASS INDEX: 37.22 KG/M2 | WEIGHT: 218 LBS | HEIGHT: 64 IN | DIASTOLIC BLOOD PRESSURE: 86 MMHG | SYSTOLIC BLOOD PRESSURE: 140 MMHG

## 2024-08-12 DIAGNOSIS — Z98.84 S/P LAPAROSCOPIC SLEEVE GASTRECTOMY: ICD-10-CM

## 2024-08-12 DIAGNOSIS — K59.01 SLOW TRANSIT CONSTIPATION: ICD-10-CM

## 2024-08-12 DIAGNOSIS — Z71.82 EXERCISE COUNSELING: ICD-10-CM

## 2024-08-12 DIAGNOSIS — Z71.3 DIETARY COUNSELING: ICD-10-CM

## 2024-08-12 DIAGNOSIS — E66.9 OBESITY, CLASS II, BMI 35-39.9: ICD-10-CM

## 2024-08-12 DIAGNOSIS — E66.01 MORBID OBESITY (HCC): Primary | ICD-10-CM

## 2024-08-12 PROCEDURE — 99024 POSTOP FOLLOW-UP VISIT: CPT | Performed by: PHYSICIAN ASSISTANT

## 2024-08-12 NOTE — PROGRESS NOTES
[facility] NUTRITION REASSESSMENT    Assessment:  Pt is 6 weeks s/p VSG.    *** is *** with soft diet compliance.    Intake of ~***g protein/d and ~***oz fluid/d.   Pt is eating ***; and drinking ***.    *** is exercising via *** for *** minutes ***x week.    Pt *** taking MVI and Ca supplements as recommended.  Pt reports ***.     Intervention:   Discussed food choices and meal ideas on regular diet, once released by surgeon.   Encouraged pt to follow mindful eating behaviors, lean protein focus followed by non-starchy vegetables as able.  Encouraged pt use protein supplements throughout the day as snacks rather than as meal replacement.    Encouraged continued and increased activity.   Pt goal of *** exercise for *** mins ***x week.      Monitoring and Evaluation:  Monitor for continued safe, supervised weight loss for VSG.     Monitor pt for meeting protein requirements of 60-80g/d, 64+ fl oz fluids.  Follow for tolerance of regular diet.   Follow for ***.  F/U per MD Sary Kerns MBA. RD, LD

## 2024-10-11 ENCOUNTER — OFFICE VISIT (OUTPATIENT)
Age: 44
End: 2024-10-11

## 2024-10-11 VITALS
HEART RATE: 85 BPM | BODY MASS INDEX: 35.85 KG/M2 | HEIGHT: 64 IN | SYSTOLIC BLOOD PRESSURE: 123 MMHG | DIASTOLIC BLOOD PRESSURE: 71 MMHG | WEIGHT: 210 LBS

## 2024-10-11 DIAGNOSIS — R12 HEARTBURN: ICD-10-CM

## 2024-10-11 DIAGNOSIS — R53.83 OTHER FATIGUE: ICD-10-CM

## 2024-10-11 DIAGNOSIS — Z71.3 DIETARY COUNSELING: ICD-10-CM

## 2024-10-11 DIAGNOSIS — R11.11 VOMITING WITHOUT NAUSEA, UNSPECIFIED VOMITING TYPE: ICD-10-CM

## 2024-10-11 DIAGNOSIS — E66.01 MORBID OBESITY: Primary | ICD-10-CM

## 2024-10-11 DIAGNOSIS — E66.812 OBESITY, CLASS II, BMI 35-39.9: ICD-10-CM

## 2024-10-11 DIAGNOSIS — Z71.82 EXERCISE COUNSELING: ICD-10-CM

## 2024-10-11 DIAGNOSIS — Z98.84 S/P LAPAROSCOPIC SLEEVE GASTRECTOMY: ICD-10-CM

## 2024-10-11 NOTE — PROGRESS NOTES
Sherita Sherwood PA-C  Bariatric & Advanced Laparoscopic Surgery & Endoscopy    Name: Arcelia Brady      MRN: [id number]       : 1980       Sex: female  PCP: Loyd Wood MD     Surgeon: Dr. Radha Mcclelland  Procedure: robotic assisted sleeve gastrectomy     Surgeon: William   DOS: 2024   Procedure: Sleeve   Pre-op weight: 244   Ideal body weight: 141   Excess body weight: 103      Weight History Graph  Last Surgical Weight Loss:      7/3/2024     9:59 AM 2024     1:56 PM 2024     1:59 PM 2024     3:15 PM 10/11/2024     9:27 AM   Surgical Weight Loss Tracker   Consult Date 3/6/2024       Initial Height 5' 3\"       Initial Weight 243 lb       Initial BMI 43.04       Ideal Body Weight 141 lb       Surgery Date 2024       Pre-Surgical Height 5' 3\"       Pre-Surgical Weight 244 lb       Pre Surgery BMI 43.22       Weight to Lose 103 lb       Date  2024 2024 2024 10/11/2024   Height  5' 4\" 5' 4\" 5' 4\" 5' 4\"   Weight  226 lb 224 lb 218 lb 210 lb   BMI  38.79 38.45 37.42 36.04   Weight Change  -18 lb -2 lb -6 lb -8 lb   Total Weight Change  -18 lb -20 lb -26 lb -34 lb   % EBWL  17% 19% 25% 33%      Subjective   3.5 months post-op visit after a robotic assisted sleeve gastrectomy was done on 2024.   She has lost 8lbs since her last office visit.    Clinical Assessment:    She is doing very well today and is feeling good.  She reports that she has been adhering to a regular diet without difficulty.  She denies any abdominal pain or nausea.  She notes occasional vomiting or heartburn with eating certain foods, including greasy foods or cheese burgers.  She denies fevers, chills, or any problems with the incision sites.  She does report having occasional problems with constipation for which she takes stool softeners.  She notes persistent fatigue, and feels that her work schedule keeps her from being able to meet her dietary goals.     Protein:

## 2024-10-11 NOTE — PROGRESS NOTES
[facility] NUTRITION REASSESSMENT  Assessment:   3.5 months post-op VSG. Pt consuming ~40g protein/d and ~40oz fluid/d. Pt is sometimes eating at least 3x/d. Pt is drinking water, light apple juice and coffee. Pt is exercising via walking and some wts. Pt is taking MVI and Ca supplements as recommended.  Pt reports still getting full off of 2-3 bites.  Diet Recall:   Breakfast: Yogurt with honey and granola   Lunch: None   Dinner: Bites of chx green beans and corn     Intervention:   Evaluated diet recall and identified modifications.  Encouraged increased lean protein  Encouraged pt to increase fluid intake  Encouraged continued and increased activity.        Monitoring and Evaluation:  Monitor for continued safe, supervised weight loss for VSG.   F/U per MD Sary Kerns MBA. RD, LD

## 2025-01-02 ENCOUNTER — OFFICE VISIT (OUTPATIENT)
Dept: SURGERY | Age: 45
End: 2025-01-02
Payer: COMMERCIAL

## 2025-01-02 VITALS
BODY MASS INDEX: 33.46 KG/M2 | HEART RATE: 75 BPM | SYSTOLIC BLOOD PRESSURE: 125 MMHG | HEIGHT: 64 IN | DIASTOLIC BLOOD PRESSURE: 77 MMHG | WEIGHT: 196 LBS

## 2025-01-02 DIAGNOSIS — E66.01 MORBID OBESITY: Primary | ICD-10-CM

## 2025-01-02 DIAGNOSIS — K59.01 SLOW TRANSIT CONSTIPATION: ICD-10-CM

## 2025-01-02 DIAGNOSIS — Z13.21 ENCOUNTER FOR VITAMIN DEFICIENCY SCREENING: ICD-10-CM

## 2025-01-02 DIAGNOSIS — R11.0 NAUSEA: ICD-10-CM

## 2025-01-02 DIAGNOSIS — Z98.84 S/P LAPAROSCOPIC SLEEVE GASTRECTOMY: ICD-10-CM

## 2025-01-02 DIAGNOSIS — Z71.82 EXERCISE COUNSELING: ICD-10-CM

## 2025-01-02 DIAGNOSIS — R55 SYMPTOM OF SYNCOPE: ICD-10-CM

## 2025-01-02 DIAGNOSIS — E66.01 MORBID OBESITY: ICD-10-CM

## 2025-01-02 DIAGNOSIS — R12 HEARTBURN: ICD-10-CM

## 2025-01-02 DIAGNOSIS — E66.811 OBESITY, CLASS I, BMI 30-34.9: ICD-10-CM

## 2025-01-02 DIAGNOSIS — Z71.3 NUTRITIONAL COUNSELING: ICD-10-CM

## 2025-01-02 LAB
ALBUMIN SERPL-MCNC: 3.3 G/DL (ref 3.5–5)
ALBUMIN/GLOB SERPL: 1 (ref 1–1.9)
ALP SERPL-CCNC: 86 U/L (ref 35–104)
ALT SERPL-CCNC: 11 U/L (ref 8–45)
ANION GAP SERPL CALC-SCNC: 8 MMOL/L (ref 7–16)
AST SERPL-CCNC: 15 U/L (ref 15–37)
BASOPHILS # BLD: 0 K/UL (ref 0–0.2)
BASOPHILS NFR BLD: 0 % (ref 0–2)
BILIRUB SERPL-MCNC: 0.3 MG/DL (ref 0–1.2)
BUN SERPL-MCNC: 13 MG/DL (ref 6–23)
CALCIUM SERPL-MCNC: 8.9 MG/DL (ref 8.8–10.2)
CHLORIDE SERPL-SCNC: 104 MMOL/L (ref 98–107)
CO2 SERPL-SCNC: 27 MMOL/L (ref 20–29)
CREAT SERPL-MCNC: 0.78 MG/DL (ref 0.6–1.1)
DIFFERENTIAL METHOD BLD: ABNORMAL
EOSINOPHIL # BLD: 0.1 K/UL (ref 0–0.8)
EOSINOPHIL NFR BLD: 2 % (ref 0.5–7.8)
ERYTHROCYTE [DISTWIDTH] IN BLOOD BY AUTOMATED COUNT: 13.9 % (ref 11.9–14.6)
GLOBULIN SER CALC-MCNC: 3.3 G/DL (ref 2.3–3.5)
GLUCOSE SERPL-MCNC: 89 MG/DL (ref 70–99)
HCT VFR BLD AUTO: 35.9 % (ref 35.8–46.3)
HGB BLD-MCNC: 11.1 G/DL (ref 11.7–15.4)
IMM GRANULOCYTES # BLD AUTO: 0 K/UL (ref 0–0.5)
IMM GRANULOCYTES NFR BLD AUTO: 0 % (ref 0–5)
IRON SERPL-MCNC: 129 UG/DL (ref 35–100)
LYMPHOCYTES # BLD: 2.2 K/UL (ref 0.5–4.6)
LYMPHOCYTES NFR BLD: 27 % (ref 13–44)
MAGNESIUM SERPL-MCNC: 2.1 MG/DL (ref 1.8–2.4)
MCH RBC QN AUTO: 26.7 PG (ref 26.1–32.9)
MCHC RBC AUTO-ENTMCNC: 30.9 G/DL (ref 31.4–35)
MCV RBC AUTO: 86.3 FL (ref 82–102)
MONOCYTES # BLD: 0.5 K/UL (ref 0.1–1.3)
MONOCYTES NFR BLD: 7 % (ref 4–12)
NEUTS SEG # BLD: 5.3 K/UL (ref 1.7–8.2)
NEUTS SEG NFR BLD: 64 % (ref 43–78)
NRBC # BLD: 0 K/UL (ref 0–0.2)
PLATELET # BLD AUTO: 324 K/UL (ref 150–450)
PMV BLD AUTO: 10.1 FL (ref 9.4–12.3)
POTASSIUM SERPL-SCNC: 4 MMOL/L (ref 3.5–5.1)
PROT SERPL-MCNC: 6.6 G/DL (ref 6.3–8.2)
RBC # BLD AUTO: 4.16 M/UL (ref 4.05–5.2)
SODIUM SERPL-SCNC: 138 MMOL/L (ref 136–145)
WBC # BLD AUTO: 8.2 K/UL (ref 4.3–11.1)

## 2025-01-02 PROCEDURE — 99215 OFFICE O/P EST HI 40 MIN: CPT | Performed by: PHYSICIAN ASSISTANT

## 2025-01-02 NOTE — PROGRESS NOTES
Sherita Sherwood PA-C  Bariatric & Advanced Lapar[facility] Surgery [facility] Endoscopy    Name: Arcelia Brady      MRN: [id number]       : 1980       Sex: female  PCP: Loyd Wood MD     Surgeon: Dr. Radha Mcclelland  Procedure: robotic assisted sleeve gastrectomy     Surgeon: Willima   DOS: 2024   Procedure: Sleeve   Pre-op weight: 244   Ideal body weight: 141   Excess body weight: 103      Weight History Graph  Last Surgical Weight Loss:      7/3/2024     9:59 AM 2024     1:56 PM 2024     1:59 PM 2024     3:15 PM 10/11/2024     9:27 AM 2025     2:18 PM   Surgical Weight Loss Tracker   Consult Date 3/6/2024        Initial Height 5' 3\"        Initial Weight 243 lb        Initial BMI 43.04        Ideal Body Weight 141 lb        Surgery Date 2024        Pre-Surgical Height 5' 3\"        Pre-Surgical Weight 244 lb        Pre Surgery BMI 43.22        Weight to Lose 103 lb        Date  2024 2024 2024 10/11/2024 2025   Height  5' 4\" 5' 4\" 5' 4\" 5' 4\" 5' 4\"   Weight  226 lb 224 lb 218 lb 210 lb 196 lb   BMI  38.79 38.45 37.42 36.04 33.64   Weight Change  -18 lb -2 lb -6 lb -8 lb -14 lb   Total Weight Change  -18 lb -20 lb -26 lb -34 lb -48 lb   % EBWL  17% 19% 25% 33% 47%      Subjective   6 months post-op visit after a robotic assisted sleeve gastrectomy was done on 2024.   She has lost 14 lbs since her last office visit.    Clinical Assessment:    She is doing well today and presents with some concerns.  She reports that she has been adhering to a regular diet without difficulty, but notes that some days she forgets to eat.  She denies any abdominal pain or vomiting.  She denies fevers, chills, or any problems with the incision sites.  She does report having problems with constipation.  She reports that she experiences nausea after eating a few bites, and experiences heartburn with greasy foods.  She notes that on 2024, she was at work

## 2025-01-02 NOTE — PROGRESS NOTES
[facility] NUTRITION REASSESSMENT  Assessment:   6 months post-op VSG. Pt consuming ~40-60g protein/d and ~32oz fluid/d. Pt is eating at least 3x/d. Pt is drinking water, coffee, light apple juice. Pt is exercising via treadmill and squats. Pt is taking MVI and Ca supplements as recommended.  Discussed passing out once - due to low food intake that day and burning when eating greasy foods  Diet Recall:   Breakfast: None   Lunch: Black eyed peas, collards, macaroni   Dinner: Ham hawaiian roll     Intervention:   Evaluated diet recall and identified modifications.  Discussed increasing frequency of eating and intake  Discussed increasing fluid intake  Encouraged continued activity.        Monitoring and Evaluation:  Monitor for continued safe, supervised weight loss for VSG.   F/U per MD Sary Kerns MBA. RD, LD

## 2025-01-03 LAB
25(OH)D3 SERPL-MCNC: 54.8 NG/ML (ref 30–100)
FERRITIN SERPL-MCNC: 13 NG/ML (ref 8–388)
FOLATE SERPL-MCNC: 22 NG/ML (ref 3.1–17.5)
TSH, 3RD GENERATION: 1.5 UIU/ML (ref 0.27–4.2)
VIT B12 SERPL-MCNC: 3583 PG/ML (ref 193–986)

## 2025-01-04 LAB — VIT B6 SERPL-MCNC: 7.3 UG/L (ref 3.4–65.2)

## 2025-01-05 LAB — VIT B1 BLD-SCNC: 119.2 NMOL/L (ref 66.5–200)

## 2025-01-10 LAB
NIACIN SERPL-MCNC: <5 NG/ML (ref 0–5)
NICOTINAMIDE SERPL-MCNC: 18.6 NG/ML (ref 5.2–72.1)

## (undated) DEVICE — APPLICATOR MEDICATED 26 CC SOLUTION HI LT ORNG CHLORAPREP

## (undated) DEVICE — CANISTER, RIGID, 2000CC: Brand: MEDLINE INDUSTRIES, INC.

## (undated) DEVICE — GENERAL ROBOTIC: Brand: MEDLINE INDUSTRIES, INC.

## (undated) DEVICE — GLOVE SURG SZ 7 L12IN FNGR THK79MIL GRN LTX FREE

## (undated) DEVICE — MARKER SURG SKIN UTIL BLK REG TIP NONSMEARING W/ 6IN RUL

## (undated) DEVICE — SOLUTION IRRIG 3000ML 0.9% SOD CHL USP UROMATIC PLAS CONT

## (undated) DEVICE — STAPLER 60 RELOAD BLUE: Brand: SUREFORM

## (undated) DEVICE — SEAL

## (undated) DEVICE — SUTURE MONOCRYL SZ 4-0 L27IN ABSRB UD L19MM PS-2 1/2 CIR PRIM Y426H

## (undated) DEVICE — SUTURE ETHIBOND EXCEL SZ 0 L30IN NONABSORBABLE GRN L26MM SH X834H

## (undated) DEVICE — SUTURE VICRYL + SZ 0 L27IN ABSRB VLT L26MM UR-6 5/8 CIR VCP603H

## (undated) DEVICE — GLOVE SURG SZ 65 THK91MIL LTX FREE SYN POLYISOPRENE

## (undated) DEVICE — COLUMN DRAPE

## (undated) DEVICE — STAPLER 60: Brand: SUREFORM

## (undated) DEVICE — VESSEL SEALER EXTEND: Brand: ENDOWRIST

## (undated) DEVICE — BINDER ABD M/L H9IN FOR 46-62IN WHT 3 SLD PNL DSGN HOOP AND

## (undated) DEVICE — SINGLE BASIN: Brand: CARDINAL HEALTH

## (undated) DEVICE — STAPLER 60 RELOAD WHITE: Brand: SUREFORM

## (undated) DEVICE — LIQUIBAND RAPID ADHESIVE 36/CS 0.8ML: Brand: MEDLINE

## (undated) DEVICE — AIRSEAL 8 MM CANNULA CAP AND OBTURATOR WITH BLADELESS OPTICAL TIP COMPATIBLE WITH INTUITIVE DA VINCI XI AND DA VINCI X 8 MM INSTRUMENT CANNULA, STANDARD LENGTH: Brand: AIRSEAL

## (undated) DEVICE — SUREFORM 45 RELOAD WHITE: Brand: SUREFORM

## (undated) DEVICE — ARM DRAPE

## (undated) DEVICE — PAD PT POS 36 IN SURGYPAD DISP

## (undated) DEVICE — BLADELESS OBTURATOR: Brand: WECK VISTA

## (undated) DEVICE — SUTURE VICRYL + SZ 3-0 L27IN ABSRB UD L26MM SH 1/2 CIR VCP416H

## (undated) DEVICE — INSUFFLATION NEEDLE TO ESTABLISH PNEUMOPERITONEUM.: Brand: INSUFFLATION NEEDLE